# Patient Record
Sex: FEMALE | Race: WHITE | NOT HISPANIC OR LATINO | Employment: OTHER | URBAN - METROPOLITAN AREA
[De-identification: names, ages, dates, MRNs, and addresses within clinical notes are randomized per-mention and may not be internally consistent; named-entity substitution may affect disease eponyms.]

---

## 2017-01-25 ENCOUNTER — GENERIC CONVERSION - ENCOUNTER (OUTPATIENT)
Dept: OTHER | Facility: OTHER | Age: 82
End: 2017-01-25

## 2017-03-22 ENCOUNTER — GENERIC CONVERSION - ENCOUNTER (OUTPATIENT)
Dept: OTHER | Facility: OTHER | Age: 82
End: 2017-03-22

## 2017-04-25 ENCOUNTER — GENERIC CONVERSION - ENCOUNTER (OUTPATIENT)
Dept: OTHER | Facility: OTHER | Age: 82
End: 2017-04-25

## 2017-05-17 ENCOUNTER — APPOINTMENT (OUTPATIENT)
Dept: LAB | Facility: CLINIC | Age: 82
End: 2017-05-17
Payer: MEDICARE

## 2017-05-17 ENCOUNTER — TRANSCRIBE ORDERS (OUTPATIENT)
Dept: LAB | Facility: CLINIC | Age: 82
End: 2017-05-17

## 2017-05-17 DIAGNOSIS — G40.909 EPILEPSY WITHOUT STATUS EPILEPTICUS, NOT INTRACTABLE (HCC): ICD-10-CM

## 2017-05-17 DIAGNOSIS — K94.02: ICD-10-CM

## 2017-05-17 DIAGNOSIS — I10 ESSENTIAL (PRIMARY) HYPERTENSION: ICD-10-CM

## 2017-05-17 LAB
25(OH)D3 SERPL-MCNC: 25.8 NG/ML (ref 30–100)
ALBUMIN SERPL BCP-MCNC: 3.5 G/DL (ref 3.5–5)
ALP SERPL-CCNC: 120 U/L (ref 46–116)
ALT SERPL W P-5'-P-CCNC: 17 U/L (ref 12–78)
ANION GAP SERPL CALCULATED.3IONS-SCNC: 6 MMOL/L (ref 4–13)
AST SERPL W P-5'-P-CCNC: 29 U/L (ref 5–45)
BACTERIA UR QL AUTO: ABNORMAL /HPF
BASOPHILS # BLD AUTO: 0.01 THOUSANDS/ΜL (ref 0–0.1)
BASOPHILS NFR BLD AUTO: 0 % (ref 0–1)
BILIRUB SERPL-MCNC: 0.56 MG/DL (ref 0.2–1)
BILIRUB UR QL STRIP: NEGATIVE
BUN SERPL-MCNC: 13 MG/DL (ref 5–25)
CALCIUM SERPL-MCNC: 9.3 MG/DL (ref 8.3–10.1)
CHLORIDE SERPL-SCNC: 107 MMOL/L (ref 100–108)
CLARITY UR: CLEAR
CO2 SERPL-SCNC: 28 MMOL/L (ref 21–32)
COLOR UR: YELLOW
CREAT SERPL-MCNC: 0.96 MG/DL (ref 0.6–1.3)
EOSINOPHIL # BLD AUTO: 0.22 THOUSAND/ΜL (ref 0–0.61)
EOSINOPHIL NFR BLD AUTO: 3 % (ref 0–6)
ERYTHROCYTE [DISTWIDTH] IN BLOOD BY AUTOMATED COUNT: 14.3 % (ref 11.6–15.1)
GFR SERPL CREATININE-BSD FRML MDRD: 54.5 ML/MIN/1.73SQ M
GGT SERPL-CCNC: 13 U/L (ref 5–85)
GLUCOSE P FAST SERPL-MCNC: 90 MG/DL (ref 65–99)
GLUCOSE UR STRIP-MCNC: NEGATIVE MG/DL
HCT VFR BLD AUTO: 41.8 % (ref 34.8–46.1)
HGB BLD-MCNC: 13.5 G/DL (ref 11.5–15.4)
HGB UR QL STRIP.AUTO: ABNORMAL
HYALINE CASTS #/AREA URNS LPF: ABNORMAL /LPF
KETONES UR STRIP-MCNC: NEGATIVE MG/DL
LEUKOCYTE ESTERASE UR QL STRIP: NEGATIVE
LYMPHOCYTES # BLD AUTO: 1.38 THOUSANDS/ΜL (ref 0.6–4.47)
LYMPHOCYTES NFR BLD AUTO: 18 % (ref 14–44)
MCH RBC QN AUTO: 29.8 PG (ref 26.8–34.3)
MCHC RBC AUTO-ENTMCNC: 32.3 G/DL (ref 31.4–37.4)
MCV RBC AUTO: 92 FL (ref 82–98)
MONOCYTES # BLD AUTO: 0.59 THOUSAND/ΜL (ref 0.17–1.22)
MONOCYTES NFR BLD AUTO: 8 % (ref 4–12)
NEUTROPHILS # BLD AUTO: 5.45 THOUSANDS/ΜL (ref 1.85–7.62)
NEUTS SEG NFR BLD AUTO: 71 % (ref 43–75)
NITRITE UR QL STRIP: NEGATIVE
NON-SQ EPI CELLS URNS QL MICRO: ABNORMAL /HPF
NRBC BLD AUTO-RTO: 0 /100 WBCS
PH UR STRIP.AUTO: 6 [PH] (ref 4.5–8)
PLATELET # BLD AUTO: 217 THOUSANDS/UL (ref 149–390)
PMV BLD AUTO: 9.9 FL (ref 8.9–12.7)
POTASSIUM SERPL-SCNC: 4.4 MMOL/L (ref 3.5–5.3)
PROT SERPL-MCNC: 7.2 G/DL (ref 6.4–8.2)
PROT UR STRIP-MCNC: NEGATIVE MG/DL
RBC # BLD AUTO: 4.53 MILLION/UL (ref 3.81–5.12)
RBC #/AREA URNS AUTO: ABNORMAL /HPF
SODIUM SERPL-SCNC: 141 MMOL/L (ref 136–145)
SP GR UR STRIP.AUTO: 1.01 (ref 1–1.03)
T4 FREE SERPL-MCNC: 0.8 NG/DL (ref 0.76–1.46)
TSH SERPL DL<=0.05 MIU/L-ACNC: 2.31 UIU/ML (ref 0.36–3.74)
UROBILINOGEN UR QL STRIP.AUTO: 0.2 E.U./DL
WBC # BLD AUTO: 7.66 THOUSAND/UL (ref 4.31–10.16)
WBC #/AREA URNS AUTO: ABNORMAL /HPF

## 2017-05-17 PROCEDURE — 82306 VITAMIN D 25 HYDROXY: CPT

## 2017-05-17 PROCEDURE — 36415 COLL VENOUS BLD VENIPUNCTURE: CPT

## 2017-05-17 PROCEDURE — 82977 ASSAY OF GGT: CPT

## 2017-05-17 PROCEDURE — 81001 URINALYSIS AUTO W/SCOPE: CPT

## 2017-05-17 PROCEDURE — 84439 ASSAY OF FREE THYROXINE: CPT

## 2017-05-17 PROCEDURE — 84443 ASSAY THYROID STIM HORMONE: CPT

## 2017-05-17 PROCEDURE — 80053 COMPREHEN METABOLIC PANEL: CPT

## 2017-05-17 PROCEDURE — 85025 COMPLETE CBC W/AUTO DIFF WBC: CPT

## 2017-05-24 ENCOUNTER — ALLSCRIPTS OFFICE VISIT (OUTPATIENT)
Dept: OTHER | Facility: OTHER | Age: 82
End: 2017-05-24

## 2017-05-24 DIAGNOSIS — E78.5 HYPERLIPIDEMIA: ICD-10-CM

## 2017-05-24 DIAGNOSIS — E55.9 VITAMIN D DEFICIENCY: ICD-10-CM

## 2017-05-24 DIAGNOSIS — R91.1 SOLITARY PULMONARY NODULE: ICD-10-CM

## 2017-05-24 DIAGNOSIS — I10 ESSENTIAL (PRIMARY) HYPERTENSION: ICD-10-CM

## 2017-07-13 ENCOUNTER — HOSPITAL ENCOUNTER (EMERGENCY)
Facility: HOSPITAL | Age: 82
Discharge: HOME | End: 2017-07-13
Attending: EMERGENCY MEDICINE | Admitting: EMERGENCY MEDICINE
Payer: MEDICARE

## 2017-07-13 VITALS
OXYGEN SATURATION: 95 % | RESPIRATION RATE: 16 BRPM | DIASTOLIC BLOOD PRESSURE: 64 MMHG | SYSTOLIC BLOOD PRESSURE: 146 MMHG | BODY MASS INDEX: 19.84 KG/M2 | TEMPERATURE: 98.2 F | HEART RATE: 84 BPM | WEIGHT: 105 LBS

## 2017-07-13 DIAGNOSIS — H11.31 SUBCONJUNCTIVAL HEMORRHAGE OF RIGHT EYE: Primary | ICD-10-CM

## 2017-07-13 PROCEDURE — 99283 EMERGENCY DEPT VISIT LOW MDM: CPT

## 2017-07-13 RX ORDER — PROPARACAINE HYDROCHLORIDE 5 MG/ML
1 SOLUTION/ DROPS OPHTHALMIC ONCE
Status: COMPLETED | OUTPATIENT
Start: 2017-07-13 | End: 2017-07-13

## 2017-07-13 RX ADMIN — FLUORESCEIN SODIUM 1 STRIP: 1 STRIP OPHTHALMIC at 21:38

## 2017-07-13 RX ADMIN — PROPARACAINE HYDROCHLORIDE 1 DROP: 5 SOLUTION/ DROPS OPHTHALMIC at 21:38

## 2017-07-18 ENCOUNTER — GENERIC CONVERSION - ENCOUNTER (OUTPATIENT)
Dept: OTHER | Facility: OTHER | Age: 82
End: 2017-07-18

## 2017-09-20 ENCOUNTER — APPOINTMENT (OUTPATIENT)
Dept: LAB | Facility: CLINIC | Age: 82
End: 2017-09-20
Payer: MEDICARE

## 2017-09-20 ENCOUNTER — TRANSCRIBE ORDERS (OUTPATIENT)
Dept: LAB | Facility: CLINIC | Age: 82
End: 2017-09-20

## 2017-09-20 DIAGNOSIS — I10 ESSENTIAL (PRIMARY) HYPERTENSION: ICD-10-CM

## 2017-09-20 DIAGNOSIS — R91.1 SOLITARY PULMONARY NODULE: ICD-10-CM

## 2017-09-20 DIAGNOSIS — E78.5 HYPERLIPIDEMIA: ICD-10-CM

## 2017-09-20 DIAGNOSIS — E55.9 VITAMIN D DEFICIENCY: ICD-10-CM

## 2017-09-20 LAB
25(OH)D3 SERPL-MCNC: 31.4 NG/ML (ref 30–100)
ANION GAP SERPL CALCULATED.3IONS-SCNC: 7 MMOL/L (ref 4–13)
BASOPHILS # BLD AUTO: 0.01 THOUSANDS/ΜL (ref 0–0.1)
BASOPHILS NFR BLD AUTO: 0 % (ref 0–1)
BUN SERPL-MCNC: 13 MG/DL (ref 5–25)
CALCIUM SERPL-MCNC: 9.7 MG/DL (ref 8.3–10.1)
CHLORIDE SERPL-SCNC: 105 MMOL/L (ref 100–108)
CO2 SERPL-SCNC: 28 MMOL/L (ref 21–32)
CREAT SERPL-MCNC: 0.99 MG/DL (ref 0.6–1.3)
EOSINOPHIL # BLD AUTO: 0.17 THOUSAND/ΜL (ref 0–0.61)
EOSINOPHIL NFR BLD AUTO: 2 % (ref 0–6)
ERYTHROCYTE [DISTWIDTH] IN BLOOD BY AUTOMATED COUNT: 14.2 % (ref 11.6–15.1)
GFR SERPL CREATININE-BSD FRML MDRD: 50 ML/MIN/1.73SQ M
GLUCOSE SERPL-MCNC: 99 MG/DL (ref 65–140)
HCT VFR BLD AUTO: 40 % (ref 34.8–46.1)
HGB BLD-MCNC: 13.4 G/DL (ref 11.5–15.4)
LYMPHOCYTES # BLD AUTO: 1.53 THOUSANDS/ΜL (ref 0.6–4.47)
LYMPHOCYTES NFR BLD AUTO: 19 % (ref 14–44)
MAGNESIUM SERPL-MCNC: 2.5 MG/DL (ref 1.6–2.6)
MCH RBC QN AUTO: 29.8 PG (ref 26.8–34.3)
MCHC RBC AUTO-ENTMCNC: 33.5 G/DL (ref 31.4–37.4)
MCV RBC AUTO: 89 FL (ref 82–98)
MONOCYTES # BLD AUTO: 0.62 THOUSAND/ΜL (ref 0.17–1.22)
MONOCYTES NFR BLD AUTO: 8 % (ref 4–12)
NEUTROPHILS # BLD AUTO: 5.64 THOUSANDS/ΜL (ref 1.85–7.62)
NEUTS SEG NFR BLD AUTO: 71 % (ref 43–75)
NRBC BLD AUTO-RTO: 0 /100 WBCS
PLATELET # BLD AUTO: 256 THOUSANDS/UL (ref 149–390)
PMV BLD AUTO: 9.9 FL (ref 8.9–12.7)
POTASSIUM SERPL-SCNC: 3.6 MMOL/L (ref 3.5–5.3)
RBC # BLD AUTO: 4.49 MILLION/UL (ref 3.81–5.12)
SODIUM SERPL-SCNC: 140 MMOL/L (ref 136–145)
TSH SERPL DL<=0.05 MIU/L-ACNC: 2 UIU/ML (ref 0.36–3.74)
WBC # BLD AUTO: 7.99 THOUSAND/UL (ref 4.31–10.16)

## 2017-09-20 PROCEDURE — 84443 ASSAY THYROID STIM HORMONE: CPT

## 2017-09-20 PROCEDURE — 36415 COLL VENOUS BLD VENIPUNCTURE: CPT

## 2017-09-20 PROCEDURE — 80048 BASIC METABOLIC PNL TOTAL CA: CPT

## 2017-09-20 PROCEDURE — 82306 VITAMIN D 25 HYDROXY: CPT

## 2017-09-20 PROCEDURE — 83735 ASSAY OF MAGNESIUM: CPT

## 2017-09-20 PROCEDURE — 85025 COMPLETE CBC W/AUTO DIFF WBC: CPT

## 2017-09-27 ENCOUNTER — ALLSCRIPTS OFFICE VISIT (OUTPATIENT)
Dept: OTHER | Facility: OTHER | Age: 82
End: 2017-09-27

## 2017-09-27 DIAGNOSIS — I10 ESSENTIAL (PRIMARY) HYPERTENSION: ICD-10-CM

## 2017-10-27 NOTE — PROGRESS NOTES
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)   2  HTN (hypertension) (401 9) (I10)   3  Cognitive disorder (294 9) (F09)   4  Crohn's disease of large intestine (555 1) (K50 10)   5  Hyperlipidemia (272 4) (E78 5)   6  Osteoarthritis (715 90) (M19 90)    Plan  HTN (hypertension)    · (1) BASIC METABOLIC PROFILE; Status:Active; Requested GMK:72SJX3632;    · (1) CBC/PLT/DIFF; Status:Active; Requested AZB:73PHM1492;    · (1) T4, FREE; Status:Active; Requested OKJ:86HUK4844;    · (1) TSH; Status:Active; Requested IQQ:24HDF8484;    · Follow-up visit in 6 months Evaluation and Treatment  Follow-up  Status: Hold For - Scheduling   Requested for: 43TLM8122  Need for prophylactic vaccination and inoculation against influenza    · Fluzone High-Dose 0 5 ML Intramuscular Suspension Prefilled Syringe    Discussion/Summary  Discussion Summary: At the present time her blood pressure is controldisease has a colostomydisorders family is supportivescan at the present time both the daughter and myself agreed be 80years old doing well we will avoid the DEXA scan for the time being she is taking vitamin D which prevents falls and fractures  Counseling Documentation With Imm: The patient was counseled regarding diagnostic results,-- instructions for management,-- risk factor reductions,-- prognosis,-- patient and family education,-- impressions,-- risks and benefits of treatment options,-- importance of compliance with treatment  Medication SE Review and Pt Understands Tx: Possible side effects of new medications were reviewed with the patient/guardian today  The treatment plan was reviewed with the patient/guardian   The patient/guardian understands and agrees with the treatment plan      Chief Complaint  Chief Complaint Free Text Note Form: 4 month f/u for osteoarthritisvaccine todayto make an appointment for Dexa Scan      History of Present Illness  HPI: Problem #1 blood pressure well-controlled no chest palpitation shortness of breath she's here with her daughternumber to cause this he has a colostomy no issues  #3 cognitive disorder she is doing well she is walking with a walker falls since the last office visit which is excellent  are stable including renal function and electrolytes and CBC will repeat that when she comes back      Review of Systems  Complete-Female:   Constitutional: No fever, no chills, feels well, no tiredness, no recent weight gain or weight loss  Eyes: No complaints of eye pain, no red eyes, no eyesight problems, no discharge, no dry eyes, no itching of eyes  ENT: no complaints of earache, no loss of hearing, no nose bleeds, no nasal discharge, no sore throat, no hoarseness  Cardiovascular: No complaints of slow heart rate, no fast heart rate, no chest pain, no palpitations, no leg claudication, no lower extremity edema  Respiratory: No complaints of shortness of breath, no wheezing, no cough, no SOB on exertion, no orthopnea, no PND  Gastrointestinal: No complaints of abdominal pain, no constipation, no nausea or vomiting, no diarrhea, no bloody stools  Genitourinary: No complaints of dysuria, no incontinence, no pelvic pain, no dysmenorrhea, no vaginal discharge or bleeding  Musculoskeletal: No complaints of arthralgias, no myalgias, no joint swelling or stiffness, no limb pain or swelling  Integumentary: No complaints of skin rash or lesions, no itching, no skin wounds, no breast pain or lump  Neurological: No complaints of headache, no confusion, no convulsions, no numbness, no dizziness or fainting, no tingling, no limb weakness, no difficulty walking  Psychiatric: Not suicidal, no sleep disturbance, no anxiety or depression, no change in personality, no emotional problems  Endocrine: No complaints of proptosis, no hot flashes, no muscle weakness, no deepening of the voice, no feelings of weakness     Hematologic/Lymphatic: No complaints of swollen glands, no swollen glands in the neck, does not bleed easily, does not bruise easily  Active Problems  1  Abnormal chest xray (793 2) (R93 8)   2  Abrasion of lower extremity, right, initial encounter (916 0) (S80 811A)   3  Cellulitis of extremity (L03 119)   4  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)   5  Cognitive disorder (294 9) (F09)   6  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)   7  Crohn's disease of large intestine (555 1) (K50 10)   8  Cutaneous candidiasis (112 3) (B37 2)   9  Cyst, kidney, 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)   10  Fall, initial encounter (E888 9) (W19 XXXA)   11  Glaucoma screening (V80 1) (Z13 5)   12  History of fall (V15 88) (Z91 81)   13  HTN (hypertension) (401 9) (I10)   14  Hyperlipidemia (272 4) (E78 5)   15  Need for pneumococcal vaccination (V03 82) (Z23)   16  Need for prophylactic vaccination and inoculation against influenza (V04 81) (Z23)   17  Osteoarthritis (715 90) (M19 90)   18  Pulmonary nodule seen on imaging study (793 11) (R91 1)   19  Screening for genitourinary condition (V81 6) (Z13 89)   20  Screening for neurological condition (V80 09) (Z13 89)   21  Screening for osteoporosis (V82 81) (Z13 820)   22  Seizure disorder (345 90) (G40 909)   23  Skin irritation (709 9) (R23 8)   24  Vitamin D deficiency (268 9) (E55 9)    Past Medical History  1  History of Diverticulosis (562 10) (K57 90)   2  History of Osteoporosis (733 00) (M81 0)    Surgical History  1  History of Cataract Surgery   2  History of Ileostomy    Family History  Mother    1  Family history of No history of seizures  Father    2  Family history of No history of seizures    Social History   · Former smoker (W30 77) (P61 128)    Current Meds   1  Calcium 600 MG Oral Tablet; TAKE 1 TABLET DAILY; Therapy: 14CCH4700 to Recorded   2  D 1000 1000 UNIT Oral Tablet; TAKE 1 TABLET DAILY  Requested for: 91VKD4843; Last   Rx:92Fvk0446 Ordered   3   Delzicol 400 MG Oral Capsule Delayed Release; take 1 capsule three times a day;   Therapy: 16Ytw4140 to (Daphne Albright) Recorded   4  Ketoconazole 2 % External Cream; APPLY A THIN LAYER TO AFFECTED AREA(S) TWICE DAILY; Therapy: 11Ruj5358 to (Evaluate:60Jyk8292)  Requested for: 68Neb0513; Last Rx:04Dhx0166   Ordered   5  Multi-Vitamin Oral Tablet; TAKE 1 TABLET DAILY; Therapy: 18CYV7922 to Recorded   6  Tylenol 325 MG Oral Tablet; 2 tablets twice a day for arthritic pain; Therapy: 03KLL2747 to (Evaluate:09Jun2015)  Requested for: 40QWL3733; Last Rx:11Mar2015   Ordered  Medication List Reviewed: The medication list was reviewed and updated today  Allergies  1  Sulfa Drugs   2  Dilaudid TABS    Vitals  Vital Signs    Recorded: 68FTK7421 01:07PM   Temperature 97 2 F   Heart Rate 84   Respiration 16   Systolic 796   Diastolic 60   Height 4 ft 9 in   Weight 106 lb 6 oz   BMI Calculated 23 02   BSA Calculated 1 38     Physical Exam    Constitutional   General appearance: No acute distress, well appearing and well nourished  Eyes   Conjunctiva and lids: No swelling, erythema or discharge  Pupils and irises: Equal, round and reactive to light  Ears, Nose, Mouth, and Throat   External inspection of ears and nose: Normal     Otoscopic examination: Tympanic membranes translucent with normal light reflex  Canals patent without erythema  Nasal mucosa, septum, and turbinates: Normal without edema or erythema  Oropharynx: Normal with no erythema, edema, exudate or lesions  Pulmonary   Respiratory effort: No increased work of breathing or signs of respiratory distress  Auscultation of lungs: Abnormal   Auscultation of the lungs revealed decreased breath sounds diffusely  Cardiovascular   Palpation of heart: Normal PMI, no thrills  Auscultation of heart: Normal rate and rhythm, normal S1 and S2, without murmurs  Examination of extremities for edema and/or varicosities: Normal     Carotid pulses: Normal     Abdomen   Abdomen: Abnormal   The abdomen was flat  Bowel sounds were normal  The abdomen was soft and nontender  The abdomen was not rigid  No rebound tenderness  no masses palpated  The abdomen was normal to percussion  no CVA tenderness   Lymphatic   Palpation of lymph nodes in neck: No lymphadenopathy  Musculoskeletal   Gait and station: Abnormal   Gait evaluation demonstrated an apractic gait  Skin   Skin and subcutaneous tissue: Normal without rashes or lesions      Psychiatric   Orientation to person, place, and time: Normal     Mood and affect: Normal          Signatures   Electronically signed by : CARMITA Estrella ; Sep 27 2017  1:41PM EST                       (Author)

## 2018-01-10 ENCOUNTER — ALLSCRIPTS OFFICE VISIT (OUTPATIENT)
Dept: OTHER | Facility: OTHER | Age: 83
End: 2018-01-10

## 2018-01-11 NOTE — MISCELLANEOUS
Message   Date: 22 Mar 2017 12:55 PM EST, Recorded By: Dexter Hester For: Due[place] Common: Svetlana Gatica, Adult Child   Reason: Medical Complaint   Dory Multani's daughter called to inform you that Bradly Knight fell onto her knees last night when she went to the bathroom  She fell because she didn't have her walker  No other reason  Bradly Knight is ok  She can flex her knees and walk good  No Pain  It's just an FYI call  Active Problems    1  Abnormal chest xray (793 2) (R93 8)   2  Abrasion of lower extremity, right, initial encounter (916 0) (S80 811A)   3  Cellulitis of extremity (L03 119)   4  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)   5  Cognitive disorder (294 9) (F09)   6  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)   7  Crohn's disease of large intestine (555 1) (K50 10)   8  Cutaneous candidiasis (112 3) (B37 2)   9  Cyst, kidney, 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)   10  Glaucoma screening (V80 1) (Z13 5)   11  HTN (hypertension) (401 9) (I10)   12  Hyperlipidemia (272 4) (E78 5)   13  Need for pneumococcal vaccination (V03 82) (Z23)   14  Need for prophylactic vaccination and inoculation against influenza (V04 81) (Z23)   15  Osteoarthritis (715 90) (M19 90)   16  Pulmonary nodule seen on imaging study (793 11) (R91 1)   17  Screening for genitourinary condition (V81 6) (Z13 89)   18  Screening for neurological condition (V80 09) (Z13 89)   19  Seizure disorder (345 90) (G40 909)   20  Skin irritation (709 9) (R23 8)    Current Meds   1  Calcium 600 MG Oral Tablet; TAKE 1 TABLET DAILY; Therapy: 61NZQ3675 to Recorded   2  Delzicol 400 MG Oral Capsule Delayed Release; take 1 capsule three times a day; Therapy: 07Wbu4495 to (Evaluate:05Apr2015) Recorded   3  Ensure Enlive Oral Liquid; USE AS DIRECTED; Therapy: 20Apr2016 to (Last Rx:20Apr2016) Ordered   4  Ketoconazole 2 % External Cream; APPLY A THIN LAYER TO AFFECTED AREA(S)   TWICE DAILY;    Therapy: 65IUW5648 to (Evaluate:72Dch3468)  Requested for: 48Nqw7774; Last   Rx:21Mbr0723 Ordered   5  Multi-Vitamin Oral Tablet; TAKE 1 TABLET DAILY; Therapy: 26PXG6901 to Recorded   6  Nystatin 445811 UNIT/GM External Powder; APPLY 2-3 TIMES DAILY TO AFFECTED   AREA(S); Therapy: 20Apr2016 to (Last Rx:20Apr2016)  Requested for: 20Apr2016 Ordered   7  Tylenol 325 MG Oral Tablet; 2 tablets twice a day for arthritic pain; Therapy: 89PDN2565 to (Evaluate:09Jun2015)  Requested for: 99IQW5725; Last   Rx:11Mar2015 Ordered   8  Vitamin D 400 UNIT TABS; Therapy: (Recorded:20Bvy8224) to Recorded    Allergies    1  Sulfa Drugs   2   Dilaudid TABS    Signatures   Electronically signed by : Peggie Gosselin, M D ; Mar 22 2017  1:21PM EST                       (Author)

## 2018-01-12 NOTE — PROGRESS NOTES
Assessment   1  Herpes zoster without complication (308 2) (K01 2)   2  Cognitive disorder (294 9) (F09)    Plan   Herpes zoster without complication    · ValACYclovir HCl - 1 GM Oral Tablet; One tablet twice a day for 1 week treatment of    has herpes zoster   · (1) CBC/PLT/DIFF; Status:Active; Requested GNU:68ULA3172;    · (1) COMPREHENSIVE METABOLIC PANEL; Status:Active; Requested LAW:51BJX9006;    · (1) GGT; Status:Active; Requested CGS:92YHU0388; Discussion/Summary      Will start her on Valtrex 1 g twice a day adjusted because of her age she is over 80  Even though her renal function is normal  To avoid side effects  The patient was counseled regarding instructions for management,-- risk factor reductions,-- patient and family education,-- impressions,-- importance of compliance with treatment  Possible side effects of new medications were reviewed with the patient/guardian today  The treatment plan was reviewed with the patient/guardian  The patient/guardian understands and agrees with the treatment plan      Chief Complaint   c/o rash on lower back on the left side for 2 days  She has no pain  Her daughter used Ketoconazole Cream on it  History of Present Illness   HPI: Patient came in because was noticed that she had a rash on the left side lower back about the T8 dermatome  With some back discomfort  Came in for further evaluation typical vesicular labs following the dermatosis of herpes zoster  has dementia and the daughter is here with her    ambulates with a [place] falls which is good      Review of Systems        Constitutional: No fever, no chills, feels well, no tiredness, no recent weight gain or loss  ENT: no ear ache, no loss of hearing, no nosebleeds or nasal discharge, no sore throat or hoarseness  Cardiovascular: no complaints of slow or fast heart rate, no chest pain, no palpitations, no leg claudication or lower extremity edema        Respiratory: no complaints of shortness of breath, no wheezing, no dyspnea on exertion, no orthopnea or PND  Breasts: no complaints of breast pain, breast lump or nipple discharge  Gastrointestinal: no complaints of abdominal pain, no constipation, no nausea or diarrhea, no vomiting, no bloody stools  Genitourinary: no complaints of dysuria, no incontinence, no pelvic pain, no dysmenorrhea, no vaginal discharge or abnormal vaginal bleeding  Musculoskeletal: no complaints of arthralgia, no myalgia, no joint swelling or stiffness, no limb pain or swelling  Integumentary: no complaints of skin rash or lesion, no itching or dry skin, no skin wounds  Neurological: no complaints of headache, no confusion, no numbness or tingling, no dizziness or fainting  Active Problems   1  Abnormal chest xray (793 2) (R93 8)   2  Abrasion of lower extremity, right, initial encounter (916 0) (S80 811A)   3  Cellulitis of extremity (L03 119)   4  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)   5  Cognitive disorder (294 9) (F09)   6  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)   7  Crohn's disease of large intestine (555 1) (K50 10)   8  Cutaneous candidiasis (112 3) (B37 2)   9  Cyst, kidney, 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)   10  Fall, initial encounter (E888 9) (W19 XXXA)   11  Glaucoma screening (V80 1) (Z13 5)   12  History of fall (V15 88) (Z91 81)   13  HTN (hypertension) (401 9) (I10)   14  Hyperlipidemia (272 4) (E78 5)   15  Need for pneumococcal vaccination (V03 82) (Z23)   16  Need for prophylactic vaccination and inoculation against influenza (V04 81) (Z23)   17  Osteoarthritis (715 90) (M19 90)   18  Pulmonary nodule seen on imaging study (793 11) (R91 1)   19  Screening for genitourinary condition (V81 6) (Z13 89)   20  Screening for neurological condition (V80 09) (Z13 89)   21  Screening for osteoporosis (V82 81) (Z13 820)   22  Seizure disorder (345 90) (G40 909)   23  Skin irritation (709 9) (R23 8)   24   Vitamin D deficiency (268 9) (E55 9)    Social History    · Former smoker (G62 85) (I87 501)    Current Meds    1  Calcium 600 MG Oral Tablet; TAKE 1 TABLET DAILY; Therapy: 65SEU1288 to Recorded   2  D 1000 1000 UNIT Oral Tablet; TAKE 1 TABLET DAILY  Requested for: 66KPV8680; Last     Rx:44Stx7987 Ordered   3  Delzicol 400 MG Oral Capsule Delayed Release; take 1 capsule three times a day; Therapy: 93Xar7369 to (Evaluate:28Jan2018)  Requested for: 79FOQ8589; Last     Rx:16Czp2309 Ordered   4  Ketoconazole 2 % External Cream; APPLY A THIN LAYER TO AFFECTED AREA(S) TWICE     DAILY; Therapy: 88Kof1406 to (Evaluate:29Lfs4054)  Requested for: 22Ryx6308; Last     Rx:25Ewk9687 Ordered   5  Multi-Vitamin Oral Tablet; TAKE 1 TABLET DAILY; Therapy: 53KVW7435 to Recorded   6  Tylenol 325 MG Oral Tablet; 2 tablets twice a day for arthritic pain; Therapy: 68IEO1816 to (Evaluate:09Jun2015)  Requested for: 83CDR5389; Last     Rx:11Mar2015 Ordered    Allergies   1  Sulfa Drugs   2  Dilaudid TABS    Vitals    Recorded: 87KRQ2556 02:00PM   Temperature 98 5 F   Heart Rate 98   Respiration 15   Systolic 397   Diastolic 75   Height 4 ft 9 in   Weight 110 lb    BMI Calculated 23 8   BSA Calculated 1 39     Physical Exam        Constitutional      General appearance: No acute distress, well appearing and well nourished  Eyes      Conjunctiva and lids: No swelling, erythema or discharge  Pupils and irises: Equal, round and reactive to light  Pulmonary      Respiratory effort: No increased work of breathing or signs of respiratory distress  Auscultation of lungs: Clear to auscultation  Cardiovascular      Palpation of heart: Normal PMI, no thrills  Auscultation of heart: Normal rate and rhythm, normal S1 and S2, without murmurs  Examination of extremities for edema and/or varicosities: Normal        Carotid pulses: Normal        Lymphatic      Palpation of lymph nodes in neck: No lymphadenopathy  Musculoskeletal      Gait and station: Abnormal   Gait evaluation demonstrated ataxia-- and-- Ambulates with a walker  Skin      Examination of the skin for lesions: Abnormal   Vesicle(s)  on the back  Psychiatric      Orientation to person, place, and time: Abnormal   Mental Status: disoriented to person,-- disoriented to place-- and-- disoriented to time  Future Appointments      Date/Time Provider Specialty Site   03/28/2018 01:00 PM CARMITA Villegas   [facility]     Signatures    Electronically signed by : CARMITA Arthur ; Jeffery 10 2018  2:41PM EST                       (Author)

## 2018-01-12 NOTE — MISCELLANEOUS
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)   7  Crohn's disease of large intestine (555 1) (K50 10)   8  Cutaneous candidiasis (112 3) (B37 2)   9  Cyst, kidney, 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)   10  Glaucoma screening (V80 1) (Z13 5)   11  HTN (hypertension) (401 9) (I10)   12  Hyperlipidemia (272 4) (E78 5)   13  Need for pneumococcal vaccination (V03 82) (Z23)   14  Need for prophylactic vaccination and inoculation against influenza (V04 81) (Z23)   15  Osteoarthritis (715 90) (M19 90)   16  Pulmonary nodule seen on imaging study (793 11) (R91 1)   17  Screening for genitourinary condition (V81 6) (Z13 89)   18  Screening for neurological condition (V80 09) (Z13 89)   19  Seizure disorder (345 90) (G40 909)   20  Skin irritation (709 9) (R23 8)    Current Meds   1  Calcium 600 MG Oral Tablet; TAKE 1 TABLET DAILY; Therapy: 75MKY6578 to Recorded   2  Delzicol 400 MG Oral Capsule Delayed Release; take 1 capsule three times a day; Therapy: 75Zfi8015 to (Evaluate:24Vbl3673) Recorded   3  Ensure Enlive Oral Liquid; USE AS DIRECTED; Therapy: 39Ndy7472 to (Last Rx:33Qii2672) Ordered   4  Ketoconazole 2 % External Cream; APPLY A THIN LAYER TO AFFECTED AREA(S)   TWICE DAILY; Therapy: 41Wrb0741 to (Evaluate:67Dhg9091)  Requested for: 06Vuj5513; Last   Rx:90Cvx8701 Ordered   5  Multi-Vitamin Oral Tablet; TAKE 1 TABLET DAILY; Therapy: 25LLV7013 to Recorded   6   Nystatin 763099 UNIT/GM External Powder; APPLY 2-3 TIMES DAILY TO AFFECTED   AREA(S); Therapy: 20Apr2016 to (Last Rx:20Apr2016)  Requested for: 20Apr2016 Ordered   7  Tylenol 325 MG Oral Tablet; 2 tablets twice a day for arthritic pain; Therapy: 78EQL8764 to (Evaluate:09Jun2015)  Requested for: 08HZR4333; Last   Rx:11Mar2015 Ordered   8  Vitamin D 400 UNIT TABS; Therapy: (Recorded:07Aqc6075) to Recorded    Allergies    1  Sulfa Drugs   2   Dilaudid TABS    Signatures   Electronically signed by : CARMITA Fonseca ; Apr 26 2017  7:19AM EST

## 2018-01-12 NOTE — MISCELLANEOUS
Chief Complaint  Chief Complaint Free Text Note Form: D/C [facility] 12 28 16 for multiple fractures of the pelvis  Cha Go (francia) did not wish to make a hospital follow up because Saqib Stover is still in pain and it's hard to move her  Medications were not verified - there was no med list in [facility] at the time this appt was made  Cha Go will call ortho if pain persists  Lidocaine patches were denied at the pharmacy - Medicare does not pay for them  Cha Go will try OTC Aspercreme with 4% Lidocaine or Salon Pas with 4% lidocaine  Cha Go will call with any questions or concerns      History of Present Illness  TCM Communication [facility]: She was hospitalized at [facility] - CLOSED  The date of admission: 12 24 16, date of discharge: 12 28 16  Diagnosis: multiple fractures of pelvis  She was discharged to home  Medications were not reviewed today  She refused a follow up appointment due to lack of mobility  The patient is currently asymptomatic  Communication performed and completed by Sujatha Cook      Active Problems    1  Abnormal chest xray (793 2) (R93 8)   2  Abrasion of lower extremity, right, initial encounter (916 0) (S80 811A)   3  Cellulitis of extremity (L03 119)   4  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)   5  Cognitive disorder (294 9) (F09)   6  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)   7  Crohn's disease of large intestine (555 1) (K50 10)   8  Cutaneous candidiasis (112 3) (B37 2)   9  Cyst, kidney, 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)   10  Glaucoma screening (V80 1) (Z13 5)   11  HTN (hypertension) (401 9) (I10)   12  Hyperlipidemia (272 4) (E78 5)   13  Need for pneumococcal vaccination (V03 82) (Z23)   14  Need for prophylactic vaccination and inoculation against influenza (V04 81) (Z23)   15  Osteoarthritis (715 90) (M19 90)   16  Pulmonary nodule seen on imaging study (793 11) (R91 1)   17  Screening for genitourinary condition (V81 6) (Z13 89)   18  Screening for neurological condition (V80 09) (Z13 89)   19   Seizure disorder (345 90) (G40 909)   20  Skin irritation (709 9) (R23 8)    Past Medical History    1  History of Diverticulosis (562 10) (K57 90)   2  History of Osteoporosis (733 00) (M81 0)    Surgical History    1  History of Cataract Surgery   2  History of Ileostomy    Family History  Mother    1  Family history of No history of seizures  Father    2  Family history of No history of seizures    Social History    · Former smoker (O46 23) (R57 147)    Current Meds   1  Calcium 600 MG Oral Tablet; TAKE 1 TABLET DAILY; Therapy: 18HKC4529 to Recorded   2  Delzicol 400 MG Oral Capsule Delayed Release; take 1 capsule three times a day; Therapy: 12Xzg8157 to (Evaluate:05Apr2015) Recorded   3  Ensure Enlive Oral Liquid; USE AS DIRECTED; Therapy: 40Qfw3516 to (Last Rx:20Apr2016) Ordered   4  Ketoconazole 2 % External Cream; APPLY A THIN LAYER TO AFFECTED AREA(S) TWICE   DAILY; Therapy: 96Rec2074 to (Evaluate:18Whf6853)  Requested for: 78Zda7704; Last   Rx:07Yld0087 Ordered   5  Multi-Vitamin Oral Tablet; TAKE 1 TABLET DAILY; Therapy: 76RJZ0865 to Recorded   6  Nystatin 030271 UNIT/GM External Powder; APPLY 2-3 TIMES DAILY TO AFFECTED   AREA(S); Therapy: 04Qka9541 to (Last Rx:88Chm2147)  Requested for: 74Slf6160 Ordered   7  Tylenol 325 MG Oral Tablet; 2 tablets twice a day for arthritic pain; Therapy: 51EOZ0711 to (Evaluate:09Jun2015)  Requested for: 05KEP8110; Last   Rx:11Mar2015 Ordered   8  Vitamin D 400 UNIT TABS; Therapy: (Recorded:80Rnf5628) to Recorded    Allergies    1  Sulfa Drugs   2  Dilaudid TABS    Future Appointments    Date/Time Provider Specialty Site   05/10/2017 01:15 PM CARMITA Santiago   Internal [facility]     Signatures   Electronically signed by : CARMITA Lantigua ; Dec 29 2016 12:56PM EST                       (Author)

## 2018-01-14 VITALS
SYSTOLIC BLOOD PRESSURE: 131 MMHG | HEART RATE: 82 BPM | WEIGHT: 102 LBS | HEIGHT: 57 IN | TEMPERATURE: 97.5 F | BODY MASS INDEX: 22.01 KG/M2 | RESPIRATION RATE: 15 BRPM | DIASTOLIC BLOOD PRESSURE: 73 MMHG

## 2018-01-15 VITALS
HEIGHT: 57 IN | TEMPERATURE: 97.2 F | SYSTOLIC BLOOD PRESSURE: 122 MMHG | HEART RATE: 84 BPM | DIASTOLIC BLOOD PRESSURE: 60 MMHG | RESPIRATION RATE: 16 BRPM | BODY MASS INDEX: 22.95 KG/M2 | WEIGHT: 106.38 LBS

## 2018-01-15 NOTE — MISCELLANEOUS
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)    Plan  Abnormal chest xray    · * XR CHEST PA & LATERAL; Status:Active; Requested for:10Mar2016;    Perform:[facility]; MSK:16UIB9265;ZNYYIXL; For:Abnormal chest xray; Ordered By:Edgar Brothers;  Chalasia, Hyperlipidemia    · (1) CBC/PLT/DIFF; Status:Active; Requested for:10May2016;    Perform:[facility]; NDL:60FKI4621;PKQDXJH; For:Chalasia, Hyperlipidemia; Ordered By:Edgar Brothers;   · (1) COMPREHENSIVE METABOLIC PANEL; Status:Active; Requested for:10May2016;    Perform:[facility]; LDH:76OZU5403;HRDCCTF; For:Chalasia, Hyperlipidemia; Ordered By:Edgar Brothers;   · (1) GGT; Status:Active; Requested for:10May2016;    Perform:[facility]; ULM:50HWP5287;QJTYOMA; For:Chalasia, Hyperlipidemia; Ordered By:Edgar Brothers;   · (1) MAGNESIUM; Status:Active; Requested for:10May2016;    Perform:[facility]; XAE:50XXE1737;UROGIPI; For:Chalasia, Hyperlipidemia; Ordered By:Edgar Brothers;   · (1) T4, FREE; Status:Active; Requested for:10May2016;    Perform:[facility]; GIQ:70WPF6726;GCOXBQW; For:Chalasia, Hyperlipidemia; Ordered By:Edgar Brothers;   · (1) TSH; Status:Active; Requested for:10May2016;    Perform:[facility]; TQO:41GHM8077;WARIYJQ; For:Chalasia, Hyperlipidemia; Ordered By:Edgar Brothers;   · (1) URIC ACID; Status:Active; Requested for:10May2016;    Perform:[facility]; UCV:81KHE7315;HJKTBTQ; For:Chalasia, Hyperlipidemia; Ordered By:Edgar Brothers;   · (1) VITAMIN D 25-HYDROXY; Status:Active; Requested for:10May2016;    Perform:[facility]; MQI:94LNG0796;EVYUTBM; For:Chalasia, Hyperlipidemia;  Ordered By:Edgar Brothers;   · Follow-up visit in 2 months Evaluation and Treatment  Follow-up  Status: Complete   Done: 10VYB6222   Ordered; For: Chalasia, Hyperlipidemia; Ordered By: Sid Torres Performed:  Due: 15AKX9595; Last Updated By: Ty Hummel; 3/10/2016 3:57:25 PM    Discussion/Summary  Discussion Summary: At the present time no change in medications  We'll check laboratory studies before she comes back with a chest x-ray  The kidney lesion cystic at the present found discussion with the daughter no further workup will be done at this time she is over the age of 80 we can always do an ultrasound the kidney to see if his groin and make that decision when she comes back in 8 weeks  Reviewing the labs her potassium was 3 4 before she left the hospital so I told the daughter to give her high potassium food like our his juice and bananas and so forth  We'll check the potassium and magnesium when she comes back  Chief Complaint  Chief Complaint Free Text Note Form: D/C [facility] 3 3 16 for partial bowel obstruction - resolved  History of Present Illness  [facility]: [facility] records were reviewed  She was hospitalized at [place] [address]  The date of admission: 3 1 16, date of discharge: 3 3  16  Diagnosis: Partial bowel obstruction - resolved  She was discharged to home  Medications were not reviewed today  She scheduled a follow up appointment  The patient is currently asymptomatic  Counseling was provided to the patient and patient's family  Topics counseled included diagnostic results, instructions for management, risk factor reductions and patient and family education     Communication performed and completed by Roisna Garcia   HPI: She is here with her daughter today she is posthospitalization a partial small bowel obstruction  Crohn colitis colectomy and ileostomy  Septated lesion in the right renal cortex and incidental finding  Lower lobe and metoprolol lesions incidental finding  Seizures no recurrences for years she is on Keppra  Urinary incontinence  She was seen by Dr Jonah Belcher a surgery and Dr Mathieu Mack gastroenterologist       Active Problems    1  Cellulitis of extremity (L03 119)   2  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)   3  Cognitive disorder (294 9) (F09)   4  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)   5  Crohn's disease of large intestine (555 1) (K50 10)   6  Glaucoma screening (V80 1) (Z13 5)   7  HTN (hypertension) (401 9) (I10)   8  Hyperlipidemia (272 4) (E78 5)   9  Need for prophylactic vaccination and inoculation against influenza (V04 81) (Z23)   10  Osteoarthritis (715 90) (M19 90)   11  Screening for genitourinary condition (V81 6) (Z13 89)   12  Screening for neurological condition (V80 09) (Z13 89)   13  Seizure disorder (345 90) (G40 909)    Past Medical History    1  History of Diverticulosis (562 10) (K57 90)   2  History of Osteoporosis (733 00) (M81 0)    Surgical History    1  History of Cataract Surgery   2  History of Ileostomy    Family History    1  Family history of No history of seizures    2  Family history of No history of seizures    Social History    · Former smoker (L78 55) (V17 767)    Current Meds   1  Calcium 600 MG Oral Tablet; TAKE 1 TABLET DAILY; Therapy: 63COU4127 to Recorded   2  Delzicol 400 MG Oral Capsule Delayed Release; take 1 capsule three times a day; Therapy: 98Mhp5503 to (Evaluate:05Apr2015) Recorded   3  LevETIRAcetam 250 MG Oral Tablet; TAKE 1 TABLET TWICE DAILYY; Therapy: (Recorded:39Zdv6232) to  Requested for: 71TZP8699 Recorded   4  Multi-Vitamin Oral Tablet; TAKE 1 TABLET DAILY; Therapy: 35NUD9256 to Recorded   5  Tylenol 325 MG Oral Tablet; 2 tablets twice a day for arthritic pain; Therapy: 66LQW4734 to (Evaluate:09Jun2015)  Requested for: 56VOP7774; Last   Rx:11Mar2015 Ordered   6  Vitamin D 400 UNIT Oral Tablet; Therapy: (Recorded:46Nhz3159) to Recorded    Allergies    1  Sulfa Drugs   2   Dilaudid TABS    Vitals  Signs [Data Includes: Current Encounter]   Recorded: 95HHO5945 03: 31PM   Heart Rate: 78  Respiration: 15  Systolic: 494  Diastolic: 60  Height: 4 ft 9 in  Weight: 96 lb 8 oz  BMI Calculated: 20 88  BSA Calculated: 1 32    Physical Exam    Constitutional   General appearance: Abnormal   chronically ill  Eyes   Conjunctiva and lids: No swelling, erythema or discharge  Pupils and irises: Equal, round and reactive to light  Ears, Nose, Mouth, and Throat   Oropharynx: Normal with no erythema, edema, exudate or lesions  Pulmonary   Respiratory effort: No increased work of breathing or signs of respiratory distress  Auscultation of lungs: Clear to auscultation  Cardiovascular   Palpation of heart: Normal PMI, no thrills  Auscultation of heart: Normal rate and rhythm, normal S1 and S2, without murmurs  Examination of extremities for edema and/or varicosities: Normal     Carotid pulses: Normal     Abdomen   Abdomen: Abnormal   The abdomen was scaphoid  Bowel sounds were normal  Skin findings: an ostomy  The abdomen was soft and nontender  no masses palpated  The abdomen was normal to percussion  no CVA tenderness   Liver and spleen: No hepatomegaly or splenomegaly  Lymphatic   Palpation of lymph nodes in neck: No lymphadenopathy  Musculoskeletal   Gait and station: Normal     Digits and nails: Normal without clubbing or cyanosis  Inspection/palpation of joints, bones, and muscles: Normal     Skin   Skin and subcutaneous tissue: Normal without rashes or lesions  Future Appointments    Date/Time Provider Specialty Site   03/23/2016 02:30 PM CARMITA Schmid   Internal Medicine [facility]   03/14/2016 11:45 AM Claude Ray, MD Gastroenterology Adult [address]     Signatures   Electronically signed by : CARMITA Wang ; Mar 10 2016  4:03PM EST                       (Author)

## 2018-01-16 NOTE — MISCELLANEOUS
Message   Recorded as Task   Date: 07/14/2017 12:00 AM, Created By: System   Task Name: Verify External Doc   Assigned To: Paige Noyola   Regarding Patient: Sherley Lopez, Status: Active   Comment:    System - 14 Jul 2017 12:00 AM     To: Paige Noyola    Recipient DirectID: Jose [email or web address]    From:     Sender DirectID: Collin Michel@"[email or web address]  Daniela Aguilarmichaela - 03 Jul 2017 1:41 PM     TASK EDITED  Patient went to ER on 7/14/17 for eye pain  Patient stated has right eye redness around cornea, swelling, sore and no acute change in vision  Patient was treated for subconjunctival hemorrhage of right eye and was given in the ER Proparacaine 0 5% ophthalmic solution 1 drop each eye and fluorescein sodium sterile 1mg ophthalmic strip 1 strip both eyes  I called the patient to follow up on how she is doing and there was no answer and no voicemail for me to leave a message  Active Problems    1  Abnormal chest xray (793 2) (R93 8)   2  Abrasion of lower extremity, right, initial encounter (916 0) (S80 811A)   3  Cellulitis of extremity (L03 119)   4  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)   5  Cognitive disorder (294 9) (F09)   6  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)   7  Crohn's disease of large intestine (555 1) (K50 10)   8  Cutaneous candidiasis (112 3) (B37 2)   9  Cyst, kidney, 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)   10  Fall, initial encounter (E888 9) (W19 XXXA)   11  Glaucoma screening (V80 1) (Z13 5)   12  History of fall (V15 88) (Z91 81)   13  HTN (hypertension) (401 9) (I10)   14  Hyperlipidemia (272 4) (E78 5)   15  Need for pneumococcal vaccination (V03 82) (Z23)   16  Need for prophylactic vaccination and inoculation against influenza (V04 81) (Z23)   17  Osteoarthritis (715 90) (M19 90)   18  Pulmonary nodule seen on imaging study (793 11) (R91 1)   19  Screening for genitourinary condition (V81 6) (Z13 89)   20  Screening for neurological condition (V80 09) (Z13 89)   21  Screening for osteoporosis (V82 81) (Z13 820)   22  Seizure disorder (345 90) (G40 909)   23  Skin irritation (709 9) (R23 8)   24  Vitamin D deficiency (268 9) (E55 9)    Current Meds   1  Calcium 600 MG Oral Tablet; TAKE 1 TABLET DAILY; Therapy: 04GUZ2556 to Recorded   2  D 1000 1000 UNIT Oral Tablet; TAKE 1 TABLET DAILY  Requested for: 56NKW8246; Last   Rx:47Egl9042 Ordered   3  Delzicol 400 MG Oral Capsule Delayed Release; take 1 capsule three times a day; Therapy: 12Ewu3655 to (Evaluate:05Apr2015) Recorded   4  Ensure Enlive Oral Liquid; USE AS DIRECTED; Therapy: 02Vhr2392 to (Last Rx:20Apr2016) Ordered   5  Ketoconazole 2 % External Cream; APPLY A THIN LAYER TO AFFECTED AREA(S)   TWICE DAILY; Therapy: 40Omq8392 to (Evaluate:28Fcu0108)  Requested for: 25Edz3494; Last   Rx:65Jvg9865 Ordered   6  Multi-Vitamin Oral Tablet; TAKE 1 TABLET DAILY; Therapy: 00JCG5428 to Recorded   7  Nystatin 560923 UNIT/GM External Powder; APPLY 2-3 TIMES DAILY TO AFFECTED   AREA(S); Therapy: 54Khc3951 to (Last Rx:27Vuv0368)  Requested for: 50Lcl6329 Ordered   8  Tylenol 325 MG Oral Tablet; 2 tablets twice a day for arthritic pain; Therapy: 82PXA6328 to (Evaluate:09Jun2015)  Requested for: 78YXS1830; Last   Rx:11Mar2015 Ordered    Allergies    1  Sulfa Drugs   2   Dilaudid TABS    Signatures   Electronically signed by : CARMITA Enriquez ; Jul 18 2017  3:21PM EST

## 2018-01-16 NOTE — MISCELLANEOUS
Message  Patient's daughter called the office c/o that the patient had redness around her ostomy site which she notice today  Patient's daughter stated that there is no swelling, redness, itchiness, or drainage around the site  After speaking to the provider , I called the patient's daughter and told her that the provider prescribed her Nizoral cream to use twice daily on effected area  Prescription was sent over to the patient's pharmacy  Active Problems    1  Abnormal chest xray (793 2) (R93 8)   2  Abrasion of lower extremity, right, initial encounter (916 0) (S80 811A)   3  Cellulitis of extremity (L03 119)   4  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)   5  Cognitive disorder (294 9) (F09)   6  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)   7  Crohn's disease of large intestine (555 1) (K50 10)   8  Cutaneous candidiasis (112 3) (B37 2)   9  Cyst, kidney, 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)   10  Glaucoma screening (V80 1) (Z13 5)   11  HTN (hypertension) (401 9) (I10)   12  Hyperlipidemia (272 4) (E78 5)   13  Need for pneumococcal vaccination (V03 82) (Z23)   14  Need for prophylactic vaccination and inoculation against influenza (V04 81) (Z23)   15  Osteoarthritis (715 90) (M19 90)   16  Pulmonary nodule seen on imaging study (793 11) (R91 1)   17  Screening for genitourinary condition (V81 6) (Z13 89)   18  Screening for neurological condition (V80 09) (Z13 89)   19  Seizure disorder (345 90) (G40 909)   20  Skin irritation (709 9) (R23 8)    Current Meds   1  Calcium 600 MG Oral Tablet; TAKE 1 TABLET DAILY; Therapy: 41BCI0500 to Recorded   2  Delzicol 400 MG Oral Capsule Delayed Release; take 1 capsule three times a day; Therapy: 57Cpg5791 to (Evaluate:30Jny2966) Recorded   3  Ensure Enlive Oral Liquid; USE AS DIRECTED; Therapy: 63Vyp5202 to (Last Rx:20Apr2016) Ordered   4  Ketoconazole 2 % External Cream; APPLY A THIN LAYER TO AFFECTED AREA(S)   TWICE DAILY;    Therapy: 93XIR5137 to (Evaluate:69Qcl9302) Requested for: 17Bdg2545; Last   Rx:90Ecy4831; Status: ACTIVE - Retrospective Authorization Ordered   5  Multi-Vitamin Oral Tablet; TAKE 1 TABLET DAILY; Therapy: 97HCW4898 to Recorded   6  Nystatin 497867 UNIT/GM External Powder; APPLY 2-3 TIMES DAILY TO AFFECTED   AREA(S); Therapy: 20Apr2016 to (Last Rx:20Apr2016)  Requested for: 20Apr2016 Ordered   7  Tylenol 325 MG Oral Tablet; 2 tablets twice a day for arthritic pain; Therapy: 35FEL6277 to (Evaluate:09Jun2015)  Requested for: 67OHG6754; Last   Rx:11Mar2015 Ordered   8  Vitamin D 400 UNIT TABS; Therapy: (Recorded:43Skd6531) to Recorded    Allergies    1  Sulfa Drugs   2   Dilaudid TABS    Signatures   Electronically signed by : CARMITA Godwin ; Dec 12 2016  6:44PM EST

## 2018-01-17 DIAGNOSIS — B02.9 ZOSTER WITHOUT COMPLICATIONS: ICD-10-CM

## 2018-01-17 NOTE — MISCELLANEOUS
Message   Date: 01 Mar 2016 3:17 PM EST, Recorded By: Laura Beaulieu For: Rhoda Horne: Mathieu Palafox, Adult Child   Reason: Medical Complaint   Patients daughter Mathieu Palafox called stated patient for the past hour has sharp cramping abdominal pain and its not coming from her Colostomy bag  She stated her pain level is at 7 from scale from 1-10  I spoke with Dr Enedelia Caba and he stated since her pain level is at a 7 she should take her to the ER at this time to be evaluated  I spoke with the daughter and gave her Dr Rhoades Console orders and she understood  Active Problems    1  Cellulitis of extremity (L03 119)   2  Cognitive disorder (294 9) (F09)   3  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)   4  Crohn's disease of large intestine (555 1) (K50 10)   5  Gastroesophageal reflux (530 81) (K21 9)   6  Glaucoma screening (V80 1) (Z13 5)   7  HTN (hypertension) (401 9) (I10)   8  Hyperlipidemia (272 4) (E78 5)   9  Need for prophylactic vaccination and inoculation against influenza (V04 81) (Z23)   10  Osteoarthritis (715 90) (M19 90)   11  Screening for genitourinary condition (V81 6) (Z13 89)   12  Screening for neurological condition (V80 09) (Z13 89)   13  Seizure disorder (345 90) (G40 909)    Current Meds   1  Calcium 600 MG Oral Tablet; TAKE 1 TABLET DAILY; Therapy: 66RQS4030 to Recorded   2  Delzicol 400 MG Oral Capsule Delayed Release; take 1 capsule three times a day; Therapy: 53Tch0889 to (Evaluate:05Apr2015) Recorded   3  LevETIRAcetam 250 MG Oral Tablet (Keppra); TAKE 1 TABLET TWICE DAILYY; Therapy: (Recorded:56Qzp7843) to  Requested for: 58DAP9734 Recorded   4  Multi-Vitamin Oral Tablet; TAKE 1 TABLET DAILY; Therapy: 70OAM7245 to Recorded   5  Tylenol 325 MG Oral Tablet; 2 tablets twice a day for arthritic pain; Therapy: 42IVA9387 to (Evaluate:09Jun2015)  Requested for: 75YCI8207; Last   Rx:11Mar2015 Ordered   6  Vitamin D 400 UNIT Oral Tablet;    Therapy: (Recorded:32Okd9639) to Recorded    Allergies    1  Sulfa Drugs   2   Dilaudid TABS    Signatures   Electronically signed by : CARMITA George ; Mar  1 2016  6:10PM EST

## 2018-01-23 ENCOUNTER — GENERIC CONVERSION - ENCOUNTER (OUTPATIENT)
Dept: OTHER | Facility: OTHER | Age: 83
End: 2018-01-23

## 2018-01-23 VITALS
RESPIRATION RATE: 15 BRPM | SYSTOLIC BLOOD PRESSURE: 147 MMHG | TEMPERATURE: 98.5 F | HEIGHT: 57 IN | BODY MASS INDEX: 23.73 KG/M2 | HEART RATE: 98 BPM | DIASTOLIC BLOOD PRESSURE: 75 MMHG | WEIGHT: 110 LBS

## 2018-01-24 NOTE — MISCELLANEOUS
Message     Date: 23 Jan 2018 4:42 PM EST, Recorded By: David Garcia For: Nhung Beckman   Caller: Flakita Shelton, Adult Child   Reason: Medical Complaint   PC from [facility]  re: PT with a rash under her R breast  She recently had & was treated for Shingles on the L side of her back 2 wks ago  She has not had any fever, pain, itching, redness with this rash  Dght explained she had no symptoms with the Shingles the first time either  They have used Ketokonizole cream on  this new rash  I spoke to DR Greg Garcia & he would like them to use the above cream on the rash & if no improvement he would like to see the Pt  Dght notified of the above & understands  She will call on Thurs  to schedule OV if no improvement  Active Problems    1  Abnormal chest xray (793 2) (R93 8)   2  Abrasion of lower extremity, right, initial encounter (916 0) (S80 811A)   3  Cellulitis of extremity (L03 119)   4  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)   5  Cognitive disorder (294 9) (F09)   6  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)   7  Crohn's disease of large intestine (555 1) (K50 10)   8  Cutaneous candidiasis (112 3) (B37 2)   9  Cyst, kidney, 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)   10  Fall, initial encounter (E888 9) (W19 XXXA)   11  Glaucoma screening (V80 1) (Z13 5)   12  Herpes zoster without complication (754 6) (B84 0)   13  History of fall (V15 88) (Z91 81)   14  HTN (hypertension) (401 9) (I10)   15  Hyperlipidemia (272 4) (E78 5)   16  Need for pneumococcal vaccination (V03 82) (Z23)   17  Need for prophylactic vaccination and inoculation against influenza (V04 81) (Z23)   18  Osteoarthritis (715 90) (M19 90)   19  Pulmonary nodule seen on imaging study (793 11) (R91 1)   20  Screening for genitourinary condition (V81 6) (Z13 89)   21  Screening for neurological condition (V80 09) (Z13 89)   22  Screening for osteoporosis (V82 81) (Z13 820)   23  Seizure disorder (345 90) (G40 909)   24  Skin irritation (933 9) (R23 8)   25  Vitamin D deficiency (268 9) (E55 9)    Current Meds   1  Calcium 600 MG Oral Tablet; TAKE 1 TABLET DAILY; Therapy: 39UQG1259 to Recorded   2  D 1000 1000 UNIT Oral Tablet; TAKE 1 TABLET DAILY  Requested for: 01JAO5041; Last   Rx:59Obq6438 Ordered   3  Delzicol 400 MG Oral Capsule Delayed Release; take 1 capsule three times a day; Therapy: 99Udu4915 to (Evaluate:28Jan2018)  Requested for: 42KXF4608; Last   Rx:30Oct2017 Ordered   4  Ketoconazole 2 % External Cream; APPLY A THIN LAYER TO AFFECTED AREA(S)   TWICE DAILY; Therapy: 12Ulc5216 to (Evaluate:48Mus4694)  Requested for: 34Hty5973; Last   Rx:72Cpg6183 Ordered   5  Multi-Vitamin Oral Tablet; TAKE 1 TABLET DAILY; Therapy: 99HNO7722 to Recorded   6  Tylenol 325 MG Oral Tablet; 2 tablets twice a day for arthritic pain; Therapy: 47XYP1648 to (Evaluate:09Jun2015)  Requested for: 92ARD8902; Last   Rx:11Mar2015 Ordered   7  ValACYclovir HCl - 1 GM Oral Tablet; One tablet twice a day for 1 week treatment of has   herpes zoster; Therapy: 22ADB9505 to (Last Rx:10Jan2018)  Requested for: 08TNH2270 Ordered    Allergies    1  Sulfa Drugs   2   Dilaudid TABS    Signatures   Electronically signed by : CARMITA Corbett ; Jan 23 2018  5:05PM EST

## 2018-02-03 ENCOUNTER — APPOINTMENT (EMERGENCY)
Dept: RADIOLOGY | Facility: HOSPITAL | Age: 83
End: 2018-02-03
Payer: MEDICARE

## 2018-02-03 ENCOUNTER — HOSPITAL ENCOUNTER (EMERGENCY)
Facility: HOSPITAL | Age: 83
Discharge: HOME | End: 2018-02-03
Attending: EMERGENCY MEDICINE | Admitting: EMERGENCY MEDICINE
Payer: MEDICARE

## 2018-02-03 ENCOUNTER — APPOINTMENT (EMERGENCY)
Dept: CT IMAGING | Facility: HOSPITAL | Age: 83
End: 2018-02-03
Payer: MEDICARE

## 2018-02-03 VITALS
RESPIRATION RATE: 18 BRPM | OXYGEN SATURATION: 98 % | DIASTOLIC BLOOD PRESSURE: 73 MMHG | WEIGHT: 112.66 LBS | BODY MASS INDEX: 24.38 KG/M2 | TEMPERATURE: 97.3 F | SYSTOLIC BLOOD PRESSURE: 136 MMHG | HEART RATE: 72 BPM
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
## 2018-02-03 DIAGNOSIS — S70.01XA CONTUSION OF RIGHT HIP: ICD-10-CM

## 2018-02-03 LAB
ANION GAP SERPL CALCULATED.3IONS-SCNC: 7 MMOL/L (ref 4–13)
APTT PPP: 26 SECONDS (ref 23–35)
BASOPHILS # BLD AUTO: 0.03 THOUSANDS/ΜL (ref 0–0.1)
BASOPHILS NFR BLD AUTO: 0 % (ref 0–1)
BUN SERPL-MCNC: 20 MG/DL (ref 5–25)
CALCIUM SERPL-MCNC: 9 MG/DL (ref 8.3–10.1)
CHLORIDE SERPL-SCNC: 106 MMOL/L (ref 100–108)
CK SERPL-CCNC: 73 U/L (ref 26–192)
CO2 SERPL-SCNC: 30 MMOL/L (ref 21–32)
CREAT SERPL-MCNC: 1.07 MG/DL (ref 0.6–1.3)
EOSINOPHIL # BLD AUTO: 0.21 THOUSAND/ΜL (ref 0–0.61)
EOSINOPHIL NFR BLD AUTO: 2 % (ref 0–6)
ERYTHROCYTE [DISTWIDTH] IN BLOOD BY AUTOMATED COUNT: 14.9 % (ref 11.6–15.1)
GFR SERPL CREATININE-BSD FRML MDRD: 45 ML/MIN/1.73SQ M
GLUCOSE SERPL-MCNC: 108 MG/DL (ref 65–140)
HCT VFR BLD AUTO: 40.2 % (ref 34.8–46.1)
HGB BLD-MCNC: 13.2 G/DL (ref 11.5–15.4)
INR PPP: 0.96 (ref 0.86–1.16)
LYMPHOCYTES # BLD AUTO: 1 THOUSANDS/ΜL (ref 0.6–4.47)
LYMPHOCYTES NFR BLD AUTO: 8 % (ref 14–44)
MCH RBC QN AUTO: 30.4 PG (ref 26.8–34.3)
MCHC RBC AUTO-ENTMCNC: 32.8 G/DL (ref 31.4–37.4)
MCV RBC AUTO: 93 FL (ref 82–98)
MONOCYTES # BLD AUTO: 0.95 THOUSAND/ΜL (ref 0.17–1.22)
MONOCYTES NFR BLD AUTO: 8 % (ref 4–12)
NEUTROPHILS # BLD AUTO: 9.69 THOUSANDS/ΜL (ref 1.85–7.62)
NEUTS SEG NFR BLD AUTO: 82 % (ref 43–75)
NRBC BLD AUTO-RTO: 0 /100 WBCS
PLATELET # BLD AUTO: 217 THOUSANDS/UL (ref 149–390)
PMV BLD AUTO: 10.1 FL (ref 8.9–12.7)
POTASSIUM SERPL-SCNC: 3.9 MMOL/L (ref 3.5–5.3)
PROTHROMBIN TIME: 12.8 SECONDS (ref 12.1–14.4)
RBC # BLD AUTO: 4.34 MILLION/UL (ref 3.81–5.12)
SODIUM SERPL-SCNC: 143 MMOL/L (ref 136–145)
WBC # BLD AUTO: 11.88 THOUSAND/UL (ref 4.31–10.16)

## 2018-02-03 PROCEDURE — 70450 CT HEAD/BRAIN W/O DYE: CPT

## 2018-02-03 PROCEDURE — 85025 COMPLETE CBC W/AUTO DIFF WBC: CPT | Performed by: EMERGENCY MEDICINE

## 2018-02-03 PROCEDURE — 82550 ASSAY OF CK (CPK): CPT | Performed by: EMERGENCY MEDICINE

## 2018-02-03 PROCEDURE — 72125 CT NECK SPINE W/O DYE: CPT

## 2018-02-03 PROCEDURE — 73502 X-RAY EXAM HIP UNI 2-3 VIEWS: CPT

## 2018-02-03 PROCEDURE — 85730 THROMBOPLASTIN TIME PARTIAL: CPT | Performed by: EMERGENCY MEDICINE

## 2018-02-03 PROCEDURE — 73700 CT LOWER EXTREMITY W/O DYE: CPT

## 2018-02-03 PROCEDURE — 80048 BASIC METABOLIC PNL TOTAL CA: CPT | Performed by: EMERGENCY MEDICINE

## 2018-02-03 PROCEDURE — 85610 PROTHROMBIN TIME: CPT | Performed by: EMERGENCY MEDICINE

## 2018-02-03 PROCEDURE — 99284 EMERGENCY DEPT VISIT MOD MDM: CPT

## 2018-02-03 PROCEDURE — 36415 COLL VENOUS BLD VENIPUNCTURE: CPT | Performed by: EMERGENCY MEDICINE

## 2018-02-03 RX ORDER — KETOCONAZOLE 20 MG/G
1 CREAM TOPICAL 2 TIMES DAILY
COMMUNITY
End: 2018-05-21 | Stop reason: SDUPTHER

## 2018-02-03 NOTE — DISCHARGE INSTRUCTIONS

## 2018-02-05 NOTE — ED PROVIDER NOTES
History  Chief Complaint   Patient presents with    Fall     pt fell around 430am today  noticed motion sensor at home went off  pt got up, slip and fell  uses walker, c/o left sided groin pain  pt has home health aide who assisted her back in bed  no deformity to left leg noted  pulses palpable no thinners, did not hit head  History provided by:  Patient and relative   used: No    Fall   Mechanism of injury: fall    Injury location:  Leg  Leg injury location:  L hip (left groin)  Incident location:  Home  Time since incident: happened at 0430 hrs  Fall:     Fall occurred: ground level at home  Impact surface:  Hard floor    Point of impact: left side  Entrapped after fall: no    Suspicion of alcohol use: no    Suspicion of drug use: no    Associated symptoms: no abdominal pain, no back pain, no chest pain, no difficulty breathing, no headaches, no loss of consciousness, no nausea, no neck pain and no vomiting    Risk factors: no anticoagulation therapy    Risk factors comment:  Age, uses a walker  No head injury  No HA, n/v, no cp or abd pain  No other extremity pain  H/O prior pelvic fracture  Prior to Admission Medications   Prescriptions Last Dose Informant Patient Reported? Taking? Vitamin D, Cholecalciferol, 400 UNITS CAPS   Yes Yes   Sig: Take 1,000 Units by mouth daily     acetaminophen (TYLENOL) 325 mg tablet   Yes Yes   Sig: Take 650 mg by mouth every 6 (six) hours as needed for mild pain  calcium carbonate (OS-KYLEE) 600 MG tablet   Yes Yes   Sig: Take 600 mg by mouth daily  ketoconazole (NIZORAL) 2 % cream   Yes Yes   Sig: Apply 1 application topically 2 (two) times a day   mesalamine (DELZICOL) 400 mg   Yes Yes   Sig: Take 400 mg by mouth 3 (three) times a day  multivitamin (THERAGRAN) TABS   Yes Yes   Sig: Take 1 tablet by mouth daily        Facility-Administered Medications: None       Past Medical History:   Diagnosis Date    [facility] colitis ([facility] [address] )     OA (osteoarthritis)     Seizures (HCC)     only 1 episode 2011    Urinary incontinence        Past Surgical History:   Procedure Laterality Date    ABDOMINAL SURGERY      CATARACT EXTRACTION      ILEOSTOMY         Family History   Problem Relation Age of Onset    Family history unknown: Yes     I have reviewed and agree with the history as documented  Social History   Substance Use Topics    Smoking status: Former Smoker    Smokeless tobacco: Never Used    Alcohol use No        Review of Systems   Constitutional: Negative for chills and fever  HENT: Negative for congestion and sore throat  Respiratory: Negative for cough, chest tightness and shortness of breath  Cardiovascular: Negative for chest pain and leg swelling  Gastrointestinal: Negative for abdominal pain, diarrhea, nausea and vomiting  Genitourinary: Negative for dysuria and flank pain  Musculoskeletal: Positive for gait problem  Negative for arthralgias, back pain, joint swelling and neck pain  Skin: Negative for wound  Neurological: Negative for loss of consciousness, weakness, numbness and headaches  All other systems reviewed and are negative  Physical Exam  ED Triage Vitals [02/03/18 1030]   Temperature Pulse Respirations Blood Pressure SpO2   (!) 97 3 °F (36 3 °C) 81 20 156/61 97 %      Temp Source Heart Rate Source Patient Position - Orthostatic VS BP Location FiO2 (%)   Oral Monitor Lying Right arm --      Pain Score       5           Orthostatic Vital Signs  Vitals:    02/03/18 1030 02/03/18 1200 02/03/18 1520   BP: 156/61 147/67 136/73   Pulse: 81 68 72   Patient Position - Orthostatic VS: Lying Lying Lying       Physical Exam   Constitutional: She appears well-developed and well-nourished  She is cooperative  Non-toxic appearance  She does not have a sickly appearance  She does not appear ill  No distress  HENT:   Head: Normocephalic and atraumatic  Right Ear: Hearing normal  No drainage or swelling     Left Ear: Hearing normal  No drainage or swelling  Mouth/Throat: Mucous membranes are normal    Eyes: Conjunctivae and lids are normal  Right eye exhibits no discharge  Left eye exhibits no discharge  Neck: Trachea normal and normal range of motion  No JVD present  Cardiovascular: Normal rate, regular rhythm, normal heart sounds, intact distal pulses and normal pulses  Exam reveals no gallop and no friction rub  No murmur heard  Pulmonary/Chest: Effort normal and breath sounds normal  No stridor  No respiratory distress  She has no wheezes  She has no rales  Abdominal: Soft  Normal appearance  She exhibits no distension, no ascites and no mass  There is no hepatosplenomegaly  There is no tenderness  There is no rebound, no guarding and no CVA tenderness  Musculoskeletal: Normal range of motion  She exhibits no edema or deformity  Right hip: Normal         Left hip: She exhibits tenderness and bony tenderness  She exhibits normal range of motion, normal strength, no swelling, no crepitus and no deformity  Left knee: Normal         Cervical back: Normal         Thoracic back: Normal         Lumbar back: Normal         Left upper leg: Normal    Lymphadenopathy:        Right: No inguinal adenopathy present  Left: No inguinal adenopathy present  Neurological: She is alert  She has normal strength  No cranial nerve deficit or sensory deficit  She exhibits normal muscle tone  Coordination and gait normal  GCS eye subscore is 4  GCS verbal subscore is 5  GCS motor subscore is 6  Skin: Skin is warm, dry and intact  No rash noted  She is not diaphoretic  No pallor  Psychiatric: She has a normal mood and affect  Her speech is normal  Cognition and memory are normal    Nursing note and vitals reviewed        ED Medications  Medications - No data to display    Diagnostic Studies  Results Reviewed     Procedure Component Value Units Date/Time    Basic metabolic panel [[id number]] Collected: 02/03/18 1051    Lab Status:  Final result Specimen:  Blood from Arm, Left Updated:  02/03/18 1114     Sodium 143 mmol/L      Potassium 3 9 mmol/L      Chloride 106 mmol/L      CO2 30 mmol/L      Anion Gap 7 mmol/L      BUN 20 mg/dL      Creatinine 1 07 mg/dL      Glucose 108 mg/dL      Calcium 9 0 mg/dL      eGFR 45 ml/min/1 73sq m     Narrative:         National Kidney Disease Education Program recommendations are as follows:  GFR calculation is accurate only with a steady state creatinine  Chronic Kidney disease less than 60 ml/min/1 73 sq  meters  Kidney failure less than 15 ml/min/1 73 sq  meters  CK Total with Reflex CKMB [[id number]]  (Normal) Collected:  02/03/18 1051    Lab Status:  Final result Specimen:  Blood from Arm, Left Updated:  02/03/18 1114     Total CK 73 U/L     Protime-INR [[id number]]  (Normal) Collected:  02/03/18 1051    Lab Status:  Final result Specimen:  Blood from Arm, Left Updated:  02/03/18 1109     Protime 12 8 seconds      INR 0 96    APTT [[id number]]  (Normal) Collected:  02/03/18 1051    Lab Status:  Final result Specimen:  Blood from Arm, Left Updated:  02/03/18 1109     PTT 26 seconds     Narrative:          Therapeutic Heparin Range = 60-90 seconds    CBC and differential [[id number]]  (Abnormal) Collected:  02/03/18 1051    Lab Status:  Final result Specimen:  Blood from Arm, Left Updated:  02/03/18 1059     WBC 11 88 (H) Thousand/uL      RBC 4 34 Million/uL      Hemoglobin 13 2 g/dL      Hematocrit 40 2 %      MCV 93 fL      MCH 30 4 pg      MCHC 32 8 g/dL      RDW 14 9 %      MPV 10 1 fL      Platelets 919 Thousands/uL      nRBC 0 /100 WBCs      Neutrophils Relative 82 (H) %      Lymphocytes Relative 8 (L) %      Monocytes Relative 8 %      Eosinophils Relative 2 %      Basophils Relative 0 %      Neutrophils Absolute 9 69 (H) Thousands/µL      Lymphocytes Absolute 1 00 Thousands/µL      Monocytes Absolute 0 95 Thousand/µL      Eosinophils Absolute 0 21 Thousand/µL      Basophils Absolute 0 03 Thousands/µL                  XR hip/pelv 2-3 vws left   ED Interpretation by Candace Plaza MD (02/03 1511)   I have personally reviewed the x-ray and my findings are: old fractures seen, no acute         Final Result by Nikunj Chen MD (02/03 1205)      No acute osseous abnormality  Workstation performed: [id number]         CT hip left without contrast   Final Result by Irma Rios MD (02/03 1254)      No acute left hip fracture         Workstation performed: [id number]         CT cervical spine without contrast   Final Result by Suraj Davis MD (02/03 1156)      No cervical spine fracture or traumatic malalignment  Multilevel degenerative disc disease and facet arthropathy  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  6 mm left lower pole thyroid nodule  Incidental discovery of one or more thyroid nodule(s) measuring less than 1 5 cm and without suspicious features is noted in this patient who is above 28years old; according to guidelines published in the February 2015 white paper on incidental thyroid nodules in the Journal of the [employer] of Radiology [facility]), no further evaluation is recommended  Workstation performed: [id number]         CT head without contrast   Final Result by Irma Rios MD (02/03 1126)      No acute intracranial abnormality  Workstation performed: [id number]                    Procedures  Procedures       Phone Contacts  ED Phone Contact    ED Course  ED Course                                MDM  Number of Diagnoses or Management Options  Contusion of right hip:   Fall:   Diagnosis management comments: Nop fracture bruise or wound noted  Able to ambulate with a walker          Amount and/or Complexity of Data Reviewed  Clinical lab tests: reviewed and ordered  Tests in the radiology section of CPT®: ordered and reviewed  Independent visualization of images, tracings, or specimens: yes    Patient Progress  Patient progress: stable    CritCare Time    Disposition  Final diagnoses:   Fall   Contusion of right hip     Time reflects when diagnosis was documented in both MDM as applicable and the Disposition within this note     Time User Action Codes Description Comment    2/3/2018  3:10 PM Poppy Gayle Add [M60  XXXA] Fall     2/3/2018  3:10 PM Poppy Gayle Add [S70 [id number]] Contusion of right hip       ED Disposition     ED Disposition Condition Comment    Discharge  Stacy Nelson 587 discharge to home/self care  Condition at discharge: Good        Follow-up Information     Follow up With Specialties Details Why Jeremias Llamas MD Internal Medicine Schedule an appointment as soon as possible for a visit in 2 days If symptoms worsen [address] [facility]  [address] [facility] [address] [facility]          Discharge Medication List as of 2/3/2018  3:11 PM      CONTINUE these medications which have NOT CHANGED    Details   acetaminophen (TYLENOL) 325 mg tablet Take 650 mg by mouth every 6 (six) hours as needed for mild pain , Historical Med      calcium carbonate (OS-KYLEE) 600 MG tablet Take 600 mg by mouth daily  , Until Discontinued, Historical Med      ketoconazole (NIZORAL) 2 % cream Apply 1 application topically 2 (two) times a day, Historical Med      mesalamine (DELZICOL) 400 mg Take 400 mg by mouth 3 (three) times a day , Until Discontinued, Historical Med      multivitamin (THERAGRAN) TABS Take 1 tablet by mouth daily  , Until Discontinued, Historical Med      Vitamin D, Cholecalciferol, 400 UNITS CAPS Take 1,000 Units by mouth daily  , Historical Med           No discharge procedures on file      ED Provider  Electronically Signed by           Eugenio Watkins MD  02/04/18 7797

## 2018-02-06 ENCOUNTER — OFFICE VISIT (OUTPATIENT)
Dept: INTERNAL MEDICINE CLINIC | Facility: CLINIC | Age: 83
End: 2018-02-06
Payer: MEDICARE

## 2018-02-06 VITALS
WEIGHT: 108.8 LBS | HEART RATE: 81 BPM | RESPIRATION RATE: 15 BRPM | SYSTOLIC BLOOD PRESSURE: 113 MMHG | BODY MASS INDEX: 23.54 KG/M2 | TEMPERATURE: 98.3 F | DIASTOLIC BLOOD PRESSURE: 70 MMHG

## 2018-02-06 DIAGNOSIS — C44.629: Primary | ICD-10-CM

## 2018-02-06 DIAGNOSIS — C44.629 SQUAMOUS CELL CARCINOMA OF SKIN OF LEFT UPPER EXTREMITY, INCLUDING SHOULDER: ICD-10-CM

## 2018-02-06 DIAGNOSIS — K50.10 CROHN'S DISEASE OF COLON WITHOUT COMPLICATION (HCC): ICD-10-CM

## 2018-02-06 PROCEDURE — 99214 OFFICE O/P EST MOD 30 MIN: CPT | Performed by: INTERNAL MEDICINE

## 2018-02-06 NOTE — PROGRESS NOTES
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, including shoulder       Refer to plastic surgeon Dr Saroj Abraham           Other Visit Diagnoses     Squamous cell cancer of multiple sites of skin of upper arm, left    -  Primary    Relevant Orders    Ambulatory referral to Plastic Surgery            Subjective:      Patient ID: Roxane Hodgkin is a 80 y o  female  Patient came in urgently there were concerned about the lesion in the antecubital fossae and also with the risk dorsum area of the left arm it 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 that should be removed will refer her to a plastic surgeon Dr José Fermin  She also has a lesion above the left shoulder  After     She was in the emergency room she fell and she had an extensive workup for the fall because she has some hip pain including a CT scan of the hip CT scan of the neck CT scan of the brain which were all unremarkable there were no evidence of fracture  Which is a good news  She is here with the daughter and her caretaker today in good spirits ambulating with a walker  And she is taking her vitamin-D  Review of Systems   Constitutional: Negative  Negative for activity change, appetite change, fatigue, fever and unexpected weight change  HENT: Negative for congestion, ear pain, hearing loss, mouth sores, postnasal drip, rhinorrhea, sore throat, trouble swallowing and voice change  Eyes: Negative for pain, redness and visual disturbance  Respiratory: Negative for cough, chest tightness, shortness of breath and wheezing  Cardiovascular: Negative for chest pain, palpitations and leg swelling  Gastrointestinal: Negative for abdominal distention, abdominal pain, blood in stool, constipation, diarrhea and nausea  Endocrine: Negative for cold intolerance, heat intolerance, polydipsia, polyphagia and polyuria  Genitourinary: Negative for difficulty urinating, dysuria, flank pain, frequency, hematuria and urgency  Musculoskeletal: Negative for arthralgias, back pain, gait problem, joint swelling and myalgias  Skin: Negative for color change and pallor  Neurological: Negative for dizziness, tremors, seizures, syncope, weakness, numbness and headaches  Hematological: Negative for adenopathy  Does not bruise/bleed easily  Psychiatric/Behavioral: Negative  Negative for sleep disturbance  The patient is not nervous/anxious  Objective:     Physical Exam   Constitutional: No distress  HENT:   Head: Normocephalic and atraumatic  Neck: Normal range of motion  Cardiovascular: Normal rate and regular rhythm  Pulmonary/Chest: Effort normal and breath sounds normal    Abdominal: Soft  Bowel sounds are normal    Musculoskeletal: She exhibits no edema, tenderness or deformity  He is ambulating with a walker  This is good to preve   Skin: Skin is warm  There is a lesion in the dorsum of the left risk nodule about 1 cm in diameter  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

## 2018-03-07 NOTE — PROGRESS NOTES
Dear Yohannes Amos,  My name is Karla Caputo and I am a Registered Nurse and Care Coordinator for [address]  We recently  spoke on the phone regarding your hospital stay and you opted to receive follow-up phone calls from me  Because of the reason that you were in the hospital, Medicare has placed you in a program called [facility]  One of  the benefits of the program is that you can have a nurse call regularly to answer any questions or concerns you may have  My phone number is listed below in case you would need to contact me      Sincerely,   Karla Caputo RN  [phone]            Electronically signed Flo Vega RN  Jan 25 2017  1:01PM EST Author

## 2018-03-20 ENCOUNTER — OFFICE VISIT (OUTPATIENT)
Dept: INTERNAL MEDICINE CLINIC | Facility: CLINIC | Age: 83
End: 2018-03-20
Payer: MEDICARE

## 2018-03-20 VITALS
TEMPERATURE: 98.2 F | RESPIRATION RATE: 15 BRPM | WEIGHT: 108 LBS | SYSTOLIC BLOOD PRESSURE: 129 MMHG | HEART RATE: 82 BPM | DIASTOLIC BLOOD PRESSURE: 76 MMHG | BODY MASS INDEX: 23.37 KG/M2

## 2018-03-20 DIAGNOSIS — R45.1 AGITATION: ICD-10-CM

## 2018-03-20 DIAGNOSIS — M19.91 PRIMARY OSTEOARTHRITIS, UNSPECIFIED SITE: Primary | ICD-10-CM

## 2018-03-20 DIAGNOSIS — G30.1 LATE ONSET ALZHEIMER'S DISEASE WITH BEHAVIORAL DISTURBANCE (HCC): ICD-10-CM

## 2018-03-20 DIAGNOSIS — F02.81 LATE ONSET ALZHEIMER'S DISEASE WITH BEHAVIORAL DISTURBANCE (HCC): ICD-10-CM

## 2018-03-20 DIAGNOSIS — K50.10 CROHN'S DISEASE OF COLON WITHOUT COMPLICATION (HCC): ICD-10-CM

## 2018-03-20 PROCEDURE — 99214 OFFICE O/P EST MOD 30 MIN: CPT | Performed by: INTERNAL MEDICINE

## 2018-03-20 RX ORDER — GABAPENTIN 100 MG/1
CAPSULE ORAL
Qty: 30 CAPSULE | Refills: 1 | Status: SHIPPED | OUTPATIENT
Start: 2018-03-20 | End: 2018-04-30 | Stop reason: SDUPTHER

## 2018-03-20 RX ORDER — GABAPENTIN 100 MG/1
100 CAPSULE ORAL 3 TIMES DAILY
Qty: 30 CAPSULE | Refills: 1 | Status: SHIPPED | OUTPATIENT
Start: 2018-03-20 | End: 2018-03-20 | Stop reason: SDUPTHER

## 2018-03-20 NOTE — PROGRESS NOTES
Assessment/Plan:    Late onset Alzheimer's disease with behavioral disturbance    I am going to start on gabapentin 100 mg at bedtime may increase to 3 per day for agitation and help her sleep    Crohn's colitis ([facility] [address] )   Stable no rectal bleeding    OA (osteoarthritis)   Generalized in not complain  Problem List Items Addressed This Visit     Crohn's colitis ([facility] [address] )      Stable no rectal bleeding         OA (osteoarthritis) - Primary      Generalized in not complain  Late onset Alzheimer's disease with behavioral disturbance       I am going to start on gabapentin 100 mg at bedtime may increase to 3 per day for agitation and help her sleep         Relevant Medications    gabapentin (NEURONTIN) 100 mg capsule      Other Visit Diagnoses     Agitation        Relevant Medications    gabapentin (NEURONTIN) 100 mg capsule            Subjective:      Patient ID: Zion Amaya is a 80 y o  female  Chief Complaint   Patient presents with    Dementia     dementia worsening, agitated         Current Outpatient Prescriptions:     acetaminophen (TYLENOL) 325 mg tablet, Take 650 mg by mouth every 6 (six) hours as needed for mild pain , Disp: , Rfl:     calcium carbonate (OS-KYLEE) 600 MG tablet, Take 600 mg by mouth daily  , Disp: , Rfl:     ketoconazole (NIZORAL) 2 % cream, Apply 1 application topically 2 (two) times a day, Disp: , Rfl:     mesalamine (DELZICOL) 400 mg, Take 400 mg by mouth daily  , Disp: , Rfl:     multivitamin (THERAGRAN) TABS, Take 1 tablet by mouth daily  , Disp: , Rfl:     Vitamin D, Cholecalciferol, 400 UNITS CAPS, Take 1,000 Units by mouth daily  , Disp: , Rfl:     gabapentin (NEURONTIN) 100 mg capsule, 1 or 2 capsules at bedtime for sleep and anxiety may use an extra 1 in the morning if needed, Disp: 30 capsule, Rfl: 1      Alzheimer's dementia with behavior problems having picking her skin than being anxious having problems sleeping she is here with the daughter and the caretaker I think in good medication for her will be gabapentin that she can take for sleep and anxiety the going to call me with a progress report next week and going to see her in 2 weeks for her regular office visit    Crohn's colostomy doing fine no rectal bleeding  Generalized osteoarthritis did not complain    Squamous cell carcinoma of the skin resected by the plastic surgeon recently healing quite nicely on the right forearm  The following portions of the patient's history were reviewed and updated as appropriate: allergies, current medications, past family history, past medical history, past social history, past surgical history and problem list     Review of Systems   Constitutional: Negative  Negative for activity change, appetite change, fatigue, fever and unexpected weight change  HENT: Negative for congestion, ear pain, hearing loss, mouth sores, postnasal drip, rhinorrhea, sore throat, trouble swallowing and voice change  Eyes: Negative for pain, redness and visual disturbance  Respiratory: Negative for cough, chest tightness, shortness of breath and wheezing  Cardiovascular: Negative for chest pain, palpitations and leg swelling  Gastrointestinal: Negative for abdominal distention, abdominal pain, blood in stool, constipation, diarrhea and nausea  Endocrine: Negative for cold intolerance, heat intolerance, polydipsia, polyphagia and polyuria  Genitourinary: Negative for difficulty urinating, dysuria, flank pain, frequency, hematuria and urgency  Musculoskeletal: Negative for arthralgias, back pain, gait problem, joint swelling and myalgias  Skin: Negative for color change and pallor  Neurological: Negative for dizziness, tremors, seizures, syncope, weakness, numbness and headaches  Hematological: Negative for adenopathy  Does not bruise/bleed easily  Psychiatric/Behavioral: Negative  Negative for sleep disturbance  The patient is not nervous/anxious  Objective:    /76 (BP Location: Left arm, Patient Position: Sitting, Cuff Size: Standard)   Pulse 82   Temp 98 2 °F (36 8 °C)   Resp 15   Wt 49 kg (108 lb)   BMI 23 37 kg/m²      Physical Exam   Constitutional: She is oriented to person, place, and time  She appears well-developed and well-nourished  HENT:   Head: Normocephalic  Right Ear: External ear normal    Left Ear: External ear normal    Nose: Nose normal    Mouth/Throat: Oropharynx is clear and moist  No oropharyngeal exudate  Eyes: Conjunctivae and EOM are normal  Pupils are equal, round, and reactive to light  Neck: Normal range of motion  Neck supple  No thyromegaly present  Cardiovascular: Normal rate, regular rhythm, normal heart sounds and intact distal pulses  Exam reveals no gallop and no friction rub  No murmur heard  Pulmonary/Chest: Effort normal and breath sounds normal  No respiratory distress  She has no wheezes  She has no rales  Abdominal: Soft  Bowel sounds are normal  She exhibits no distension and no mass  There is no tenderness  There is no rebound and no guarding  Musculoskeletal: Normal range of motion  Ambulates with a walker  Lymphadenopathy:     She has no cervical adenopathy  Neurological: She is alert and oriented to person, place, and time  Skin: Skin is warm and dry  Dry lesion was resected on the forearm on the left arm   Psychiatric: She has a normal mood and affect  Her behavior is normal  Judgment normal    Agitation   Nursing note and vitals reviewed

## 2018-03-20 NOTE — ASSESSMENT & PLAN NOTE
I am going to start on gabapentin 100 mg at bedtime may increase to 3 per day for agitation and help her sleep

## 2018-03-20 NOTE — PATIENT INSTRUCTIONS
Alzheimer Disease   AMBULATORY CARE:   Alzheimer disease  is an irreversible brain disorder that results in the gradual loss of memory  Parts of the brain die and cannot make normal levels of brain chemicals  The disease usually starts at about age 72 to 79 years but can start earlier  Common symptoms include the following:   · Mild AD:  Early AD symptoms may be minor and last from 1 to 3 years  ¨ Memory loss: Remembering what happened years ago, but unable to remember things from yesterday or forgetting the names of common things or people you know    ¨ Confusion about what month or season it is    ¨ Forgetting to brush your teeth or comb your hair    ¨ Difficulty taking care of your home or finances or difficulty making decisions    ¨ Loss of interest in your usual activities    ¨ Feeling depressed, angry, or confused about the changes you notice    · Moderate AD:      ¨ Problems choosing what clothes to wear, doing simple jobs, or caring for your self    ¨ Unable to recognize people familiar to you    ¨ Difficulty finding words to say what you mean, trouble talking in normal sentences, or speech that is difficult to understand    ¨ Feeling anxious, restless, and agitated at night and seeming depressed or worried    ¨ Difficulty controlling emotions and becoming loud, violent, and hard to control    ¨ Becoming confused and wandering off or pacing    ¨ Unable to plan and follow through with activities    ¨ Thinking something is true even though it is not, seeing things that are not actually there, or inability to control when you urinate    · Severe AD:      ¨ Complete loss of memory    ¨ Complete loss of speech    ¨ Loss of bladder and bowel control    ¨ Finding it very difficult to walk    ¨ Becoming angry and out of control or aggressive and destroying things    ¨ Unable to care for yourself and needing someone to take care of you  Contact your healthcare provider if:   · You have a fever      · You have a rash or your skin is itchy and swollen  · You are depressed and have difficulty coping with your symptoms  · You have questions or concerns about your condition or care  Treatment for AD  may include medicines to help increase the amount of normal chemicals in your brain or slow the death of brain cells  You may also need medicines to help control your mood or help you feel less nervous or restless  Medicines to help with bladder and bowel control or to help you sleep better may be needed  Some people may be given medicines to control delusions (false beliefs), hallucinations, or violent behaviors  Counseling can teach you ways to cope with your disease  Care for someone who has AD:   · Keep activities the same from day to day  People with AD do best with daily routines  Take breaks often  Save difficult activities for when the person seems the most alert  Choose activities that the person is interested in doing  Help the person get started and try to break difficult activities into small steps  · Keep mealtimes at the same time each day  It is best to limit food choices  Eating patterns may change in people with AD  It may help to have the person eat small meals and snacks  Ask healthcare providers about giving vitamins if you do not think the person is eating enough  · Get regular exercise  Regular exercise may help decrease depression and anxiety and improve sleep  Walking is a good exercise for people with AD  Check local senior centers for information about group exercise activities  · Learn to recognize pain or discomfort  Noisy breathing or rigid body movements may be a sign of pain  A sad or scared look may also mean the person is having discomfort  The person may also constantly make certain noises when he or she is in pain  · Provide personal care  Make sure to check the water temperature of the bath or shower so they do not burn themselves   It may help to choose and lay out clothes each night for them to wear the next day  Make sure to brush the person's teeth or dentures daily  Do not forget to take the person to the dentist for exams  · Provide a safe environment  Go through the house and remove things that could cause accidents  Make sure household  and medicines are out of reach  You may need to remove the stove burner knobs and hide matches and lighters to prevent injury  Remove loose rugs to prevent falls  Keep doors and windows tightly closed to prevent wandering or other injuries  If the person smokes, make sure cigarettes are always put out  · Keep the person with AD active and awake during the day  Limit how much liquid the person drinks in the evening  This may help him or her sleep through the night  Make sure that the person goes to bed at the same time every night  · Use short words or sentences when speaking to the person with AD  Call the person by name and speak slowly, clearly, and calmly  Use short words and sentences  Use the same words if you have to repeat yourself  Do not ask too many questions  Do not argue with the person  · Create a bathroom schedule  This may mean reminding the person to urinate every 4 hours  Be aware of the person's bowel movement routine and keep it the same  · Prevent wandering  New door locks may be needed to keep the person from going outside alone  An alarm system near doors may tell you if the person wanders out  Have the person wear a necklace or bracelet with their name and phone number on it in case they wander away from you and are found by someone else  Follow up with your healthcare provider as directed:  Ask someone to go with you to help you remember what your healthcare provider tells you  The person can take notes for you during the visit and go over the notes with you later  Write down your questions so you remember to ask them during your visits    © 2017 [facility]  is for End User's use only and may not be sold, redistributed or otherwise used for commercial purposes  All illustrations and images included in CareNotes® are the copyrighted property of [employer]  or [address]  The above information is an  only  It is not intended as medical advice for individual conditions or treatments  Talk to your doctor, nurse or pharmacist before following any medical regimen to see if it is safe and effective for you  We will use gabapentin to help her sleep and to help her anxiety  She can take up to 3 tablets a day 1 or 2 at bedtime and 1 in the morning

## 2018-04-03 ENCOUNTER — OFFICE VISIT (OUTPATIENT)
Dept: INTERNAL MEDICINE CLINIC | Facility: CLINIC | Age: 83
End: 2018-04-03
Payer: MEDICARE

## 2018-04-03 VITALS
RESPIRATION RATE: 15 BRPM | BODY MASS INDEX: 23.8 KG/M2 | SYSTOLIC BLOOD PRESSURE: 110 MMHG | WEIGHT: 110 LBS | TEMPERATURE: 98.2 F | DIASTOLIC BLOOD PRESSURE: 70 MMHG | HEART RATE: 97 BPM

## 2018-04-03 DIAGNOSIS — F02.81 LATE ONSET ALZHEIMER'S DISEASE WITH BEHAVIORAL DISTURBANCE (HCC): Primary | ICD-10-CM

## 2018-04-03 DIAGNOSIS — K50.10 CROHN'S DISEASE OF COLON WITHOUT COMPLICATION (HCC): ICD-10-CM

## 2018-04-03 DIAGNOSIS — M19.91 PRIMARY OSTEOARTHRITIS, UNSPECIFIED SITE: ICD-10-CM

## 2018-04-03 DIAGNOSIS — G30.1 LATE ONSET ALZHEIMER'S DISEASE WITH BEHAVIORAL DISTURBANCE (HCC): Primary | ICD-10-CM

## 2018-04-03 PROCEDURE — 99214 OFFICE O/P EST MOD 30 MIN: CPT | Performed by: INTERNAL MEDICINE

## 2018-04-03 NOTE — PROGRESS NOTES
Assessment/Plan:    Crohn's colitis ([facility] )    Remission no hematemesis hematochezia  Late onset Alzheimer's disease with behavioral disturbance    Gabapentin  work well for her anxiety    OA (osteoarthritis)   Did not complain of pain may use Tylenol ambulates with a walker         Problem List Items Addressed This Visit     Crohn's colitis ([facility] )       Remission no hematemesis hematochezia  OA (osteoarthritis)      Did not complain of pain may use Tylenol ambulates with a walker         Late onset Alzheimer's disease with behavioral disturbance - Primary       Gabapentin  work well for her anxiety                 Subjective:      Patient ID: Karine Hernandez is a 80 y o  female  Chief Complaint   Patient presents with    Follow-up     2 falls in the past year, she has urinary incontinence           Current Outpatient Prescriptions:     acetaminophen (TYLENOL) 325 mg tablet, Take 650 mg by mouth every 6 (six) hours as needed for mild pain , Disp: , Rfl:     calcium carbonate (OS-KYLEE) 600 MG tablet, Take 600 mg by mouth daily  , Disp: , Rfl:     gabapentin (NEURONTIN) 100 mg capsule, 1 or 2 capsules at bedtime for sleep and anxiety may use an extra 1 in the morning if needed, Disp: 30 capsule, Rfl: 1    ketoconazole (NIZORAL) 2 % cream, Apply 1 application topically 2 (two) times a day, Disp: , Rfl:     mesalamine (DELZICOL) 400 mg, Take 400 mg by mouth daily  , Disp: , Rfl:     multivitamin (THERAGRAN) TABS, Take 1 tablet by mouth daily  , Disp: , Rfl:     Vitamin D, Cholecalciferol, 400 UNITS CAPS, Take 1,000 Units by mouth daily  , Disp: , Rfl:       Problem 1  Dementia anxiety responded well to gabapentin they are only giving her 100 mg in the afternoon I told her they could increase it to 300 a day if needed  She looks great she is not anxious  Dermatophyte the rash has resolved with the anus or rale cream she can use it on a p r n   Basis    Crohn's no diarrhea no hematemesis hematochezia she gained 2 lb  Ambulates with a walker is been no fall since I saw her last         The following portions of the patient's history were reviewed and updated as appropriate: allergies, current medications, past family history, past medical history, past social history, past surgical history and problem list     Review of Systems   Constitutional: Negative  Negative for activity change, appetite change, fatigue, fever and unexpected weight change  HENT: Negative for congestion, ear pain, hearing loss, mouth sores, postnasal drip, rhinorrhea, sore throat, trouble swallowing and voice change  Eyes: Negative for pain, redness and visual disturbance  Respiratory: Negative for cough, chest tightness, shortness of breath and wheezing  Cardiovascular: Negative for chest pain, palpitations and leg swelling  Gastrointestinal: Negative for abdominal distention, abdominal pain, blood in stool, constipation, diarrhea and nausea  Endocrine: Negative for cold intolerance, heat intolerance, polydipsia, polyphagia and polyuria  Genitourinary: Negative for difficulty urinating, dysuria, flank pain, frequency, hematuria and urgency  Musculoskeletal: Negative for arthralgias, back pain, gait problem, joint swelling and myalgias  Skin: Negative for color change and pallor  Neurological: Negative for dizziness, tremors, seizures, syncope, weakness, numbness and headaches  Hematological: Negative for adenopathy  Does not bruise/bleed easily  Psychiatric/Behavioral: Negative  Negative for sleep disturbance  The patient is not nervous/anxious            Objective:    Results for orders placed or performed during the hospital encounter of 02/03/18   CBC and differential   Result Value Ref Range    WBC 11 88 (H) 4 31 - 10 16 Thousand/uL    RBC 4 34 3 81 - 5 12 Million/uL    Hemoglobin 13 2 11 5 - 15 4 g/dL    Hematocrit 40 2 34 8 - 46 1 %    MCV 93 82 - 98 fL    MCH 30 4 26 8 - 34 3 pg    MCHC 32 8 31 4 - 37 4 g/dL    RDW 14 9 11 6 - 15 1 %    MPV 10 1 8 9 - 12 7 fL    Platelets 114 114 - 340 Thousands/uL    nRBC 0 /100 WBCs    Neutrophils Relative 82 (H) 43 - 75 %    Lymphocytes Relative 8 (L) 14 - 44 %    Monocytes Relative 8 4 - 12 %    Eosinophils Relative 2 0 - 6 %    Basophils Relative 0 0 - 1 %    Neutrophils Absolute 9 69 (H) 1 85 - 7 62 Thousands/µL    Lymphocytes Absolute 1 00 0 60 - 4 47 Thousands/µL    Monocytes Absolute 0 95 0 17 - 1 22 Thousand/µL    Eosinophils Absolute 0 21 0 00 - 0 61 Thousand/µL    Basophils Absolute 0 03 0 00 - 0 10 Thousands/µL   Protime-INR   Result Value Ref Range    Protime 12 8 12 1 - 14 4 seconds    INR 0 96 0 86 - 1 16   APTT   Result Value Ref Range    PTT 26 23 - 35 seconds   Basic metabolic panel   Result Value Ref Range    Sodium 143 136 - 145 mmol/L    Potassium 3 9 3 5 - 5 3 mmol/L    Chloride 106 100 - 108 mmol/L    CO2 30 21 - 32 mmol/L    Anion Gap 7 4 - 13 mmol/L    BUN 20 5 - 25 mg/dL    Creatinine 1 07 0 60 - 1 30 mg/dL    Glucose 108 65 - 140 mg/dL    Calcium 9 0 8 3 - 10 1 mg/dL    eGFR 45 ml/min/1 73sq m   CK Total with Reflex CKMB   Result Value Ref Range    Total CK 73 26 - 192 U/L       /70 (BP Location: Left arm, Patient Position: Sitting, Cuff Size: Standard)   Pulse 97   Temp 98 2 °F (36 8 °C)   Resp 15   Wt 49 9 kg (110 lb)   BMI 23 80 kg/m²      Physical Exam   Constitutional: She is oriented to person, place, and time  She appears well-developed and well-nourished  HENT:   Head: Normocephalic  Right Ear: External ear normal    Left Ear: External ear normal    Nose: Nose normal    Mouth/Throat: Oropharynx is clear and moist  No oropharyngeal exudate  Eyes: Conjunctivae and EOM are normal  Pupils are equal, round, and reactive to light  Neck: Normal range of motion  Neck supple  No thyromegaly present  Cardiovascular: Normal rate, regular rhythm, normal heart sounds and intact distal pulses  Exam reveals no gallop and no friction rub  No murmur heard  no dependent edema   Pulmonary/Chest: Effort normal and breath sounds normal  No respiratory distress  She has no wheezes  She has no rales  Abdominal: Soft  Bowel sounds are normal  She exhibits no distension and no mass  There is no tenderness  There is no rebound and no guarding  Colostomy bag  Functioning no abdominal tenderness  Musculoskeletal: Normal range of motion  She exhibits no edema, tenderness or deformity  Lymphadenopathy:     She has no cervical adenopathy  Neurological: She is alert and oriented to person, place, and time  Skin: Skin is warm and dry  Psychiatric: She has a normal mood and affect  Her behavior is normal  Judgment normal    Dementia and confused but less anxious on gabapentin   Nursing note and vitals reviewed

## 2018-04-03 NOTE — PATIENT INSTRUCTIONS
Anxiety has improved with the gabapentin will continue taking that you may use at 100 mg 3 times a day as needed if you need to  Continue uses the  nizoral  cream as needed

## 2018-04-30 DIAGNOSIS — R45.1 AGITATION: ICD-10-CM

## 2018-04-30 DIAGNOSIS — F02.81 LATE ONSET ALZHEIMER'S DISEASE WITH BEHAVIORAL DISTURBANCE (HCC): ICD-10-CM

## 2018-04-30 DIAGNOSIS — G30.1 LATE ONSET ALZHEIMER'S DISEASE WITH BEHAVIORAL DISTURBANCE (HCC): ICD-10-CM

## 2018-04-30 RX ORDER — GABAPENTIN 100 MG/1
CAPSULE ORAL
Qty: 30 CAPSULE | Refills: 1 | Status: SHIPPED | OUTPATIENT
Start: 2018-04-30 | End: 2018-05-21 | Stop reason: SDUPTHER

## 2018-05-21 ENCOUNTER — OFFICE VISIT (OUTPATIENT)
Dept: INTERNAL MEDICINE CLINIC | Facility: CLINIC | Age: 83
End: 2018-05-21
Payer: MEDICARE

## 2018-05-21 VITALS
SYSTOLIC BLOOD PRESSURE: 124 MMHG | BODY MASS INDEX: 23.37 KG/M2 | RESPIRATION RATE: 15 BRPM | TEMPERATURE: 97.5 F | WEIGHT: 108 LBS | DIASTOLIC BLOOD PRESSURE: 70 MMHG | HEART RATE: 87 BPM

## 2018-05-21 DIAGNOSIS — R45.1 AGITATION: ICD-10-CM

## 2018-05-21 DIAGNOSIS — C44.629 SQUAMOUS CELL CARCINOMA OF SKIN OF LEFT UPPER EXTREMITY, INCLUDING SHOULDER: ICD-10-CM

## 2018-05-21 DIAGNOSIS — K50.10 CROHN'S DISEASE OF COLON WITHOUT COMPLICATION (HCC): Primary | ICD-10-CM

## 2018-05-21 DIAGNOSIS — B35.9 DERMATOPHYTOSES: ICD-10-CM

## 2018-05-21 DIAGNOSIS — G30.1 LATE ONSET ALZHEIMER'S DISEASE WITH BEHAVIORAL DISTURBANCE (HCC): ICD-10-CM

## 2018-05-21 DIAGNOSIS — F02.81 LATE ONSET ALZHEIMER'S DISEASE WITH BEHAVIORAL DISTURBANCE (HCC): ICD-10-CM

## 2018-05-21 DIAGNOSIS — M19.91 PRIMARY OSTEOARTHRITIS, UNSPECIFIED SITE: ICD-10-CM

## 2018-05-21 PROBLEM — B36.9 FUNGAL DERMATITIS: Status: ACTIVE | Noted: 2018-05-21

## 2018-05-21 PROCEDURE — 99214 OFFICE O/P EST MOD 30 MIN: CPT | Performed by: INTERNAL MEDICINE

## 2018-05-21 RX ORDER — GABAPENTIN 100 MG/1
CAPSULE ORAL
Qty: 100 CAPSULE | Refills: 3 | Status: SHIPPED | OUTPATIENT
Start: 2018-05-21 | End: 2018-11-20 | Stop reason: SDUPTHER

## 2018-05-21 RX ORDER — KETOCONAZOLE 20 MG/G
1 CREAM TOPICAL 2 TIMES DAILY
Qty: 15 G | Refills: 0 | Status: SHIPPED | OUTPATIENT
Start: 2018-05-21 | End: 2019-04-05 | Stop reason: SDUPTHER

## 2018-05-21 NOTE — PROGRESS NOTES
Assessment/Plan:    Crohn's colitis ([facility] )   Colitis in remission  Late onset Alzheimer's disease with behavioral disturbance    Continue gabapentin for agitation concerning her dementia    OA (osteoarthritis)   Did not complain using a walker for gait dysfunction  No falls    Fungal dermatitis   Mostly in the groin continue using the nizoral cream          Problem List Items Addressed This Visit     Crohn's colitis ([facility] ) - Primary      Colitis in remission  Relevant Orders    CBC and differential    TSH, 3rd generation with T4 reflex    Comprehensive metabolic panel    Vitamin D 25 hydroxy    OA (osteoarthritis)      Did not complain using a walker for gait dysfunction  No falls         Relevant Orders    CBC and differential    TSH, 3rd generation with T4 reflex    Comprehensive metabolic panel    Vitamin D 25 hydroxy    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, including shoulder    Relevant Medications    ketoconazole (NIZORAL) 2 % cream    Late onset Alzheimer's disease with behavioral disturbance       Continue gabapentin for agitation concerning her dementia         Relevant Medications    gabapentin (NEURONTIN) 100 mg capsule    Other Relevant Orders    CBC and differential    TSH, 3rd generation with T4 reflex    Comprehensive metabolic panel    Vitamin D 25 hydroxy      Other Visit Diagnoses     Agitation        Relevant Medications    gabapentin (NEURONTIN) 100 mg capsule    Other Relevant Orders    CBC and differential    TSH, 3rd generation with T4 reflex    Comprehensive metabolic panel    Vitamin D 25 hydroxy    Dermatophytoses        Relevant Medications    ketoconazole (NIZORAL) 2 % cream            Subjective:      Patient ID: Evelin Virgen is a 80 y o  female  Chief Complaint   Patient presents with    Follow-up         Current Outpatient Prescriptions:     acetaminophen (TYLENOL) 325 mg tablet, Take 650 mg by mouth every 6 (six) hours as needed for mild pain  , Disp: , Rfl:     calcium carbonate (OS-KYLEE) 600 MG tablet, Take 600 mg by mouth daily  , Disp: , Rfl:     gabapentin (NEURONTIN) 100 mg capsule, Take 1 capsule in the morning and 1 or 2 capsules at bedtime for up to 3 capsules per day, Disp: 100 capsule, Rfl: 3    ketoconazole (NIZORAL) 2 % cream, Apply 1 application topically 2 (two) times a day, Disp: 15 g, Rfl: 0    mesalamine (DELZICOL) 400 mg, Take 400 mg by mouth daily  , Disp: , Rfl:     multivitamin (THERAGRAN) TABS, Take 1 tablet by mouth daily  , Disp: , Rfl:     Vitamin D, Cholecalciferol, 400 UNITS CAPS, Take 1,000 Units by mouth daily  , Disp: , Rfl:      Problem 1  Crohn colitis no rectal bleeding no abdominal pain weight is stable  Problem 2  Dementia Alzheimer's supportive with the caretaker and the daughter gabapentin seems to help her sleeping pattern and her anxiety continue the same I told him they could use up to 300 mg per day    Gait dysfunction DJD uses a walker is had no falls which is excellent  History of a fungal in for a dermatitis using nizoral cream continue the same  I did examine the groin area is difficult because she is incontinent of urine  The following portions of the patient's history were reviewed and updated as appropriate: allergies, current medications, past family history, past medical history, past social history, past surgical history and problem list     Review of Systems   Constitutional: Negative  Negative for activity change, appetite change, fatigue, fever and unexpected weight change  HENT: Negative for congestion, ear pain, hearing loss, mouth sores, postnasal drip, rhinorrhea, sore throat, trouble swallowing and voice change  Eyes: Negative for pain, redness and visual disturbance  Respiratory: Negative for cough, chest tightness, shortness of breath and wheezing  Cardiovascular: Negative for chest pain, palpitations and leg swelling     Gastrointestinal: Negative for abdominal distention, abdominal pain, blood in stool, constipation, diarrhea and nausea  Endocrine: Negative for cold intolerance, heat intolerance, polydipsia, polyphagia and polyuria  Genitourinary: Negative for difficulty urinating, dysuria, flank pain, frequency, hematuria and urgency  Musculoskeletal: Negative for arthralgias, back pain, gait problem, joint swelling and myalgias  Skin: Negative for color change and pallor  Neurological: Negative for dizziness, tremors, seizures, syncope, weakness, numbness and headaches  Hematological: Negative for adenopathy  Does not bruise/bleed easily  Psychiatric/Behavioral: Negative  Negative for sleep disturbance  The patient is not nervous/anxious            Objective:    Results for orders placed or performed during the hospital encounter of 02/03/18   CBC and differential   Result Value Ref Range    WBC 11 88 (H) 4 31 - 10 16 Thousand/uL    RBC 4 34 3 81 - 5 12 Million/uL    Hemoglobin 13 2 11 5 - 15 4 g/dL    Hematocrit 40 2 34 8 - 46 1 %    MCV 93 82 - 98 fL    MCH 30 4 26 8 - 34 3 pg    MCHC 32 8 31 4 - 37 4 g/dL    RDW 14 9 11 6 - 15 1 %    MPV 10 1 8 9 - 12 7 fL    Platelets 114 015 - 891 Thousands/uL    nRBC 0 /100 WBCs    Neutrophils Relative 82 (H) 43 - 75 %    Lymphocytes Relative 8 (L) 14 - 44 %    Monocytes Relative 8 4 - 12 %    Eosinophils Relative 2 0 - 6 %    Basophils Relative 0 0 - 1 %    Neutrophils Absolute 9 69 (H) 1 85 - 7 62 Thousands/µL    Lymphocytes Absolute 1 00 0 60 - 4 47 Thousands/µL    Monocytes Absolute 0 95 0 17 - 1 22 Thousand/µL    Eosinophils Absolute 0 21 0 00 - 0 61 Thousand/µL    Basophils Absolute 0 03 0 00 - 0 10 Thousands/µL   Protime-INR   Result Value Ref Range    Protime 12 8 12 1 - 14 4 seconds    INR 0 96 0 86 - 1 16   APTT   Result Value Ref Range    PTT 26 23 - 35 seconds   Basic metabolic panel   Result Value Ref Range    Sodium 143 136 - 145 mmol/L    Potassium 3 9 3 5 - 5 3 mmol/L    Chloride 106 100 - 108 mmol/L CO2 30 21 - 32 mmol/L    Anion Gap 7 4 - 13 mmol/L    BUN 20 5 - 25 mg/dL    Creatinine 1 07 0 60 - 1 30 mg/dL    Glucose 108 65 - 140 mg/dL    Calcium 9 0 8 3 - 10 1 mg/dL    eGFR 45 ml/min/1 73sq m   CK Total with Reflex CKMB   Result Value Ref Range    Total CK 73 26 - 192 U/L       /70 (BP Location: Left arm, Patient Position: Sitting, Cuff Size: Standard)   Pulse 87   Temp 97 5 °F (36 4 °C)   Resp 15   Wt 49 kg (108 lb)   BMI 23 37 kg/m²      Physical Exam   Constitutional: She is oriented to person, place, and time  She appears well-developed and well-nourished  HENT:   Head: Normocephalic  Right Ear: External ear normal    Left Ear: External ear normal    Nose: Nose normal    Mouth/Throat: Oropharynx is clear and moist  No oropharyngeal exudate  Eyes: Conjunctivae and EOM are normal  Pupils are equal, round, and reactive to light  Neck: Normal range of motion  Neck supple  No thyromegaly present  Cardiovascular: Normal rate, regular rhythm, normal heart sounds and intact distal pulses  Exam reveals no gallop and no friction rub  No murmur heard  Pulmonary/Chest: Effort normal and breath sounds normal  No respiratory distress  She has no wheezes  She has no rales  Abdominal: Soft  Bowel sounds are normal  She exhibits no distension and no mass  There is no tenderness  There is no rebound and no guarding  Musculoskeletal: Normal range of motion  Lymphadenopathy:     She has no cervical adenopathy  Neurological: She is alert and oriented to person, place, and time  Skin: Skin is warm and dry  There is erythema  Psychiatric: She has a normal mood and affect  Her behavior is normal  Judgment normal    Nursing note and vitals reviewed

## 2018-07-05 ENCOUNTER — TELEPHONE (OUTPATIENT)
Dept: INTERNAL MEDICINE CLINIC | Facility: CLINIC | Age: 83
End: 2018-07-05

## 2018-07-05 DIAGNOSIS — N30.00 ACUTE CYSTITIS WITHOUT HEMATURIA: Primary | ICD-10-CM

## 2018-07-05 RX ORDER — CEPHALEXIN 500 MG/1
500 CAPSULE ORAL EVERY 12 HOURS SCHEDULED
Qty: 14 CAPSULE | Refills: 0 | Status: SHIPPED | OUTPATIENT
Start: 2018-07-05 | End: 2018-07-12

## 2018-07-05 NOTE — TELEPHONE ENCOUNTER
PC from [facility] Anupama Claros re: Aiyana Thomas with urinary frequency, discomfort voiding, no fever, no bleeding, for 2 days  She is Allergic to Sulfa  I spoke to DR Lizbeth Mcclellan who will send Keflex to her Pharmacy  [facility]   Notified & understands

## 2018-09-10 DIAGNOSIS — K50.812 CROHN'S DISEASE OF BOTH SMALL AND LARGE INTESTINE WITH INTESTINAL OBSTRUCTION (HCC): Primary | ICD-10-CM

## 2018-09-10 RX ORDER — MESALAMINE 400 MG/1
CAPSULE, DELAYED RELEASE ORAL
Qty: 90 CAPSULE | Refills: 2 | Status: SHIPPED | OUTPATIENT
Start: 2018-09-10 | End: 2019-01-30 | Stop reason: ALTCHOICE

## 2018-09-24 ENCOUNTER — APPOINTMENT (OUTPATIENT)
Dept: LAB | Facility: CLINIC | Age: 83
End: 2018-09-24
Payer: MEDICARE

## 2018-09-24 DIAGNOSIS — B02.9 ZOSTER WITHOUT COMPLICATIONS: ICD-10-CM

## 2018-09-24 LAB
ALBUMIN SERPL BCP-MCNC: 3.4 G/DL (ref 3.5–5)
ALP SERPL-CCNC: 80 U/L (ref 46–116)
ALT SERPL W P-5'-P-CCNC: 14 U/L (ref 12–78)
ANION GAP SERPL CALCULATED.3IONS-SCNC: 7 MMOL/L (ref 4–13)
AST SERPL W P-5'-P-CCNC: 16 U/L (ref 5–45)
BASOPHILS # BLD AUTO: 0.03 THOUSANDS/ΜL (ref 0–0.1)
BASOPHILS NFR BLD AUTO: 0 % (ref 0–1)
BILIRUB SERPL-MCNC: 0.6 MG/DL (ref 0.2–1)
BUN SERPL-MCNC: 14 MG/DL (ref 5–25)
CALCIUM SERPL-MCNC: 9.6 MG/DL (ref 8.3–10.1)
CHLORIDE SERPL-SCNC: 106 MMOL/L (ref 100–108)
CO2 SERPL-SCNC: 29 MMOL/L (ref 21–32)
CREAT SERPL-MCNC: 0.98 MG/DL (ref 0.6–1.3)
EOSINOPHIL # BLD AUTO: 0.1 THOUSAND/ΜL (ref 0–0.61)
EOSINOPHIL NFR BLD AUTO: 2 % (ref 0–6)
ERYTHROCYTE [DISTWIDTH] IN BLOOD BY AUTOMATED COUNT: 13.8 % (ref 11.6–15.1)
GFR SERPL CREATININE-BSD FRML MDRD: 50 ML/MIN/1.73SQ M
GGT SERPL-CCNC: 8 U/L (ref 5–85)
GLUCOSE SERPL-MCNC: 122 MG/DL (ref 65–140)
HCT VFR BLD AUTO: 44.6 % (ref 34.8–46.1)
HGB BLD-MCNC: 14.1 G/DL (ref 11.5–15.4)
IMM GRANULOCYTES # BLD AUTO: 0.03 THOUSAND/UL (ref 0–0.2)
IMM GRANULOCYTES NFR BLD AUTO: 0 % (ref 0–2)
LYMPHOCYTES # BLD AUTO: 1.51 THOUSANDS/ΜL (ref 0.6–4.47)
LYMPHOCYTES NFR BLD AUTO: 22 % (ref 14–44)
MCH RBC QN AUTO: 29.9 PG (ref 26.8–34.3)
MCHC RBC AUTO-ENTMCNC: 31.6 G/DL (ref 31.4–37.4)
MCV RBC AUTO: 95 FL (ref 82–98)
MONOCYTES # BLD AUTO: 0.51 THOUSAND/ΜL (ref 0.17–1.22)
MONOCYTES NFR BLD AUTO: 8 % (ref 4–12)
NEUTROPHILS # BLD AUTO: 4.61 THOUSANDS/ΜL (ref 1.85–7.62)
NEUTS SEG NFR BLD AUTO: 68 % (ref 43–75)
NRBC BLD AUTO-RTO: 0 /100 WBCS
PLATELET # BLD AUTO: 269 THOUSANDS/UL (ref 149–390)
PMV BLD AUTO: 10.5 FL (ref 8.9–12.7)
POTASSIUM SERPL-SCNC: 3.7 MMOL/L (ref 3.5–5.3)
PROT SERPL-MCNC: 7.5 G/DL (ref 6.4–8.2)
RBC # BLD AUTO: 4.72 MILLION/UL (ref 3.81–5.12)
SODIUM SERPL-SCNC: 142 MMOL/L (ref 136–145)
WBC # BLD AUTO: 6.79 THOUSAND/UL (ref 4.31–10.16)

## 2018-09-24 PROCEDURE — 85025 COMPLETE CBC W/AUTO DIFF WBC: CPT

## 2018-09-24 PROCEDURE — 80053 COMPREHEN METABOLIC PANEL: CPT

## 2018-09-24 PROCEDURE — 36415 COLL VENOUS BLD VENIPUNCTURE: CPT

## 2018-09-24 PROCEDURE — 82977 ASSAY OF GGT: CPT

## 2018-09-26 ENCOUNTER — OFFICE VISIT (OUTPATIENT)
Dept: INTERNAL MEDICINE CLINIC | Facility: CLINIC | Age: 83
End: 2018-09-26
Payer: MEDICARE

## 2018-09-26 VITALS
SYSTOLIC BLOOD PRESSURE: 136 MMHG | TEMPERATURE: 98.5 F | DIASTOLIC BLOOD PRESSURE: 78 MMHG | HEART RATE: 106 BPM | WEIGHT: 108 LBS | BODY MASS INDEX: 23.37 KG/M2 | RESPIRATION RATE: 15 BRPM

## 2018-09-26 DIAGNOSIS — F02.81 LATE ONSET ALZHEIMER'S DISEASE WITH BEHAVIORAL DISTURBANCE (HCC): ICD-10-CM

## 2018-09-26 DIAGNOSIS — M19.91 PRIMARY OSTEOARTHRITIS, UNSPECIFIED SITE: ICD-10-CM

## 2018-09-26 DIAGNOSIS — Z23 NEED FOR INFLUENZA VACCINATION: ICD-10-CM

## 2018-09-26 DIAGNOSIS — K50.10 CROHN'S DISEASE OF COLON WITHOUT COMPLICATION (HCC): Primary | ICD-10-CM

## 2018-09-26 DIAGNOSIS — G30.1 LATE ONSET ALZHEIMER'S DISEASE WITH BEHAVIORAL DISTURBANCE (HCC): ICD-10-CM

## 2018-09-26 DIAGNOSIS — C44.629 SQUAMOUS CELL CARCINOMA OF SKIN OF LEFT UPPER EXTREMITY, INCLUDING SHOULDER: ICD-10-CM

## 2018-09-26 PROCEDURE — 99214 OFFICE O/P EST MOD 30 MIN: CPT | Performed by: INTERNAL MEDICINE

## 2018-09-26 PROCEDURE — G0008 ADMIN INFLUENZA VIRUS VAC: HCPCS | Performed by: INTERNAL MEDICINE

## 2018-09-26 PROCEDURE — 90662 IIV NO PRSV INCREASED AG IM: CPT | Performed by: INTERNAL MEDICINE

## 2018-09-26 NOTE — ASSESSMENT & PLAN NOTE
Resection of the Forum a squamous cell looks great no recurrence she has an abrasion in the nose that the going to watch

## 2018-09-26 NOTE — PROGRESS NOTES
Assessment/Plan:  Doing well no change in medication no lab work has been ordered for the next office visit    Crohn's colitis ([facility] [address] )  No problems with her bowel movements  No abdominal pain or fever or bleeding    Late onset Alzheimer's disease with behavioral disturbance  The mentions progressive she is happy her gabapentin is helping her sleep I told her to the can use gabapentin during the day for behavior agitation she is taking gabapentin 2 at bedtime 100 she can take an extra 200 during the day if needed for total 400 per day  OA (osteoarthritis)  Not complain as far as the joint pain she ambulates with a walker no history of falls    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, including shoulder  Resection of the Forum a squamous cell looks great no recurrence she has an abrasion in the nose that the going to watch       Diagnoses and all orders for this visit:    Crohn's disease of colon without complication ([facility] [address] )    Late onset Alzheimer's disease with behavioral disturbance    Primary osteoarthritis, unspecified site    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, including shoulder    Need for influenza vaccination  -     influenza vaccine, 3791-6156, high-dose, PF 0 5 mL, for patients 65 yr+ (FLUZONE HIGH-DOSE)          Subjective:      Patient ID: Iris Friedmantingham is a 80 y o  female  Chief Complaint   Patient presents with    Follow-up         Current Outpatient Prescriptions:     acetaminophen (TYLENOL) 325 mg tablet, Take 650 mg by mouth every 6 (six) hours as needed for mild pain , Disp: , Rfl:     calcium carbonate (OS-KYLEE) 600 MG tablet, Take 600 mg by mouth daily  , Disp: , Rfl:     DELZICOL 400 MG, TAKE 1 CAPSULE THREE TIMES A DAY, Disp: 90 capsule, Rfl: 2    gabapentin (NEURONTIN) 100 mg capsule, Take 1 capsule in the morning and 1 or 2 capsules at bedtime for up to 3 capsules per day, Disp: 100 capsule, Rfl: 3    ketoconazole (NIZORAL) 2 % cream, Apply 1 application topically 2 (two) times a day, Disp: 15 g, Rfl: 0    multivitamin (THERAGRAN) TABS, Take 1 tablet by mouth daily  , Disp: , Rfl:     Vitamin D, Cholecalciferol, 400 UNITS CAPS, Take 1,000 Units by mouth daily  , Disp: , Rfl:     HPI    The following portions of the patient's history were reviewed and updated as appropriate: allergies, current medications, past family history, past medical history, past social history, past surgical history and problem list     Review of Systems   Constitutional: Negative  Negative for activity change, appetite change, fatigue, fever and unexpected weight change  HENT: Negative for congestion, ear pain, hearing loss, mouth sores, postnasal drip, rhinorrhea, sore throat, trouble swallowing and voice change  Eyes: Negative for pain, redness and visual disturbance  Respiratory: Negative for cough, chest tightness, shortness of breath and wheezing  Cardiovascular: Negative for chest pain, palpitations and leg swelling  Gastrointestinal: Negative for abdominal distention, abdominal pain, blood in stool, constipation, diarrhea and nausea  Endocrine: Negative for cold intolerance, heat intolerance, polydipsia, polyphagia and polyuria  Genitourinary: Negative for difficulty urinating, dysuria, flank pain, frequency, hematuria and urgency  Musculoskeletal: Negative for arthralgias, back pain, gait problem, joint swelling and myalgias  Skin: Negative for color change and pallor  Neurological: Negative for dizziness, tremors, seizures, syncope, weakness, numbness and headaches  Hematological: Negative for adenopathy  Does not bruise/bleed easily  Psychiatric/Behavioral: Negative  Negative for sleep disturbance  The patient is not nervous/anxious            Objective:    Results for orders placed or performed in visit on 09/24/18   CBC and differential   Result Value Ref Range    WBC 6 79 4 31 - 10 16 Thousand/uL    RBC 4 72 3 81 - 5 12 Million/uL Hemoglobin 14 1 11 5 - 15 4 g/dL    Hematocrit 44 6 34 8 - 46 1 %    MCV 95 82 - 98 fL    MCH 29 9 26 8 - 34 3 pg    MCHC 31 6 31 4 - 37 4 g/dL    RDW 13 8 11 6 - 15 1 %    MPV 10 5 8 9 - 12 7 fL    Platelets 778 751 - 792 Thousands/uL    nRBC 0 /100 WBCs    Neutrophils Relative 68 43 - 75 %    Immat GRANS % 0 0 - 2 %    Lymphocytes Relative 22 14 - 44 %    Monocytes Relative 8 4 - 12 %    Eosinophils Relative 2 0 - 6 %    Basophils Relative 0 0 - 1 %    Neutrophils Absolute 4 61 1 85 - 7 62 Thousands/µL    Immature Grans Absolute 0 03 0 00 - 0 20 Thousand/uL    Lymphocytes Absolute 1 51 0 60 - 4 47 Thousands/µL    Monocytes Absolute 0 51 0 17 - 1 22 Thousand/µL    Eosinophils Absolute 0 10 0 00 - 0 61 Thousand/µL    Basophils Absolute 0 03 0 00 - 0 10 Thousands/µL   Comprehensive metabolic panel   Result Value Ref Range    Sodium 142 136 - 145 mmol/L    Potassium 3 7 3 5 - 5 3 mmol/L    Chloride 106 100 - 108 mmol/L    CO2 29 21 - 32 mmol/L    ANION GAP 7 4 - 13 mmol/L    BUN 14 5 - 25 mg/dL    Creatinine 0 98 0 60 - 1 30 mg/dL    Glucose 122 65 - 140 mg/dL    Calcium 9 6 8 3 - 10 1 mg/dL    AST 16 5 - 45 U/L    ALT 14 12 - 78 U/L    Alkaline Phosphatase 80 46 - 116 U/L    Total Protein 7 5 6 4 - 8 2 g/dL    Albumin 3 4 (L) 3 5 - 5 0 g/dL    Total Bilirubin 0 60 0 20 - 1 00 mg/dL    eGFR 50 ml/min/1 73sq m   Gamma GT   Result Value Ref Range    GGT 8 5 - 85 U/L       /78 (BP Location: Left arm, Patient Position: Sitting, Cuff Size: Standard)   Pulse (!) 106   Temp 98 5 °F (36 9 °C)   Resp 15   Wt 49 kg (108 lb)   BMI 23 37 kg/m²      Physical Exam   Constitutional: She is oriented to person, place, and time  She appears well-developed and well-nourished  HENT:   Head: Normocephalic  Right Ear: External ear normal    Left Ear: External ear normal    Nose: Nose normal    Mouth/Throat: Oropharynx is clear and moist  No oropharyngeal exudate     Tympanic members clear she wears bilateral hearing aids Eyes: Conjunctivae and EOM are normal  Pupils are equal, round, and reactive to light  Neck: Normal range of motion  Neck supple  No thyromegaly present  Cardiovascular: Normal rate, regular rhythm, normal heart sounds and intact distal pulses  Exam reveals no gallop and no friction rub  No murmur heard  S1-S2 no gallops  Extremities no edema   Pulmonary/Chest: Effort normal and breath sounds normal  No respiratory distress  She has no wheezes  She has no rales  Lungs are clear   Abdominal: Soft  Bowel sounds are normal  She exhibits no distension and no mass  There is no tenderness  There is no rebound and no guarding  Abdomen nontender   Musculoskeletal: Normal range of motion  Lymphadenopathy:     She has no cervical adenopathy  Neurological: She is alert and oriented to person, place, and time  Skin: Skin is warm and dry  Psychiatric: She has a normal mood and affect  Her behavior is normal  Judgment normal    Dementia is progressive most of the history from caretaker and daughter   Nursing note and vitals reviewed

## 2018-09-26 NOTE — PATIENT INSTRUCTIONS
We will see her back in 4 months am not going to change the plan you may give her gabapentin if you need to give her 1 or 2 extra capsule you may do so so it up to 500 a day

## 2018-09-26 NOTE — ASSESSMENT & PLAN NOTE
The mentions progressive she is happy her gabapentin is helping her sleep I told her to the can use gabapentin during the day for behavior agitation she is taking gabapentin 2 at bedtime 100 she can take an extra 200 during the day if needed for total 400 per day

## 2018-11-20 DIAGNOSIS — G30.1 LATE ONSET ALZHEIMER'S DISEASE WITH BEHAVIORAL DISTURBANCE (HCC): ICD-10-CM

## 2018-11-20 DIAGNOSIS — R45.1 AGITATION: ICD-10-CM

## 2018-11-20 DIAGNOSIS — F02.81 LATE ONSET ALZHEIMER'S DISEASE WITH BEHAVIORAL DISTURBANCE (HCC): ICD-10-CM

## 2018-11-20 RX ORDER — GABAPENTIN 100 MG/1
CAPSULE ORAL
Qty: 100 CAPSULE | Refills: 2 | Status: SHIPPED | OUTPATIENT
Start: 2018-11-20 | End: 2019-03-25 | Stop reason: SDUPTHER

## 2019-01-30 ENCOUNTER — OFFICE VISIT (OUTPATIENT)
Dept: INTERNAL MEDICINE CLINIC | Facility: CLINIC | Age: 84
End: 2019-01-30
Payer: MEDICARE

## 2019-01-30 VITALS
TEMPERATURE: 97.4 F | BODY MASS INDEX: 24.89 KG/M2 | SYSTOLIC BLOOD PRESSURE: 136 MMHG | RESPIRATION RATE: 14 BRPM | DIASTOLIC BLOOD PRESSURE: 80 MMHG | HEART RATE: 81 BPM | WEIGHT: 115 LBS

## 2019-01-30 DIAGNOSIS — C44.629 SQUAMOUS CELL CANCER OF SKIN OF LEFT FOREARM: ICD-10-CM

## 2019-01-30 DIAGNOSIS — F02.81 LATE ONSET ALZHEIMER'S DISEASE WITH BEHAVIORAL DISTURBANCE (HCC): Primary | ICD-10-CM

## 2019-01-30 DIAGNOSIS — G30.1 LATE ONSET ALZHEIMER'S DISEASE WITH BEHAVIORAL DISTURBANCE (HCC): Primary | ICD-10-CM

## 2019-01-30 DIAGNOSIS — K50.10 CROHN'S DISEASE OF COLON WITHOUT COMPLICATION (HCC): ICD-10-CM

## 2019-01-30 DIAGNOSIS — C44.629 SQUAMOUS CELL CARCINOMA OF SKIN OF LEFT UPPER EXTREMITY, INCLUDING SHOULDER: ICD-10-CM

## 2019-01-30 DIAGNOSIS — M19.91 PRIMARY OSTEOARTHRITIS, UNSPECIFIED SITE: ICD-10-CM

## 2019-01-30 PROCEDURE — 99214 OFFICE O/P EST MOD 30 MIN: CPT | Performed by: INTERNAL MEDICINE

## 2019-01-30 NOTE — ASSESSMENT & PLAN NOTE
She is here with her 8 and her daughter doing well gabapentin seems to be helping they gave her from 2-4 a day

## 2019-01-30 NOTE — ASSESSMENT & PLAN NOTE
She is in remission she is not taking  dezicol she has had no changes in bowel movement no rectal bleeding no abdominal pain no fever or chills

## 2019-01-30 NOTE — ASSESSMENT & PLAN NOTE
She has developed another squamous cell cancer in the upper arm on the left refer to plastic for evaluation removal she has an abrasion in the tip of the nose as suspected maybe an early squamous cell cancer or actinic keratoses that she picks [facility] to review that

## 2019-01-30 NOTE — PATIENT INSTRUCTIONS
Get her lab work to done at least before the next office visit  As see the plastic surgeon concerning lesion in your left arm and also to have him check the nose  I will see her back in the spring

## 2019-01-30 NOTE — PROGRESS NOTES
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, including shoulder  She has developed another squamous cell cancer in the upper arm on the left refer to plastic for evaluation removal she has an abrasion in the tip of the nose as suspected maybe an early squamous cell cancer or actinic keratoses that she picks [facility] to review that       Diagnoses and all orders for this visit:    Late onset Alzheimer's disease with behavioral disturbance  -     CBC and differential; Future  -     Comprehensive metabolic panel; Future  -     Lipid Panel with Direct LDL reflex; Future  -     TSH, 3rd generation with Free T4 reflex; Future  -     Vitamin D 25 hydroxy; Future  -     Magnesium; Future  -     Gamma GT; Future    Crohn's disease of colon without complication ([facility] )  -     CBC and differential; Future  -     Comprehensive metabolic panel; Future  -     Lipid Panel with Direct LDL reflex; Future  -     TSH, 3rd generation with Free T4 reflex; Future  -     Vitamin D 25 hydroxy; Future  -     Magnesium; Future  -     Gamma GT; Future    Primary osteoarthritis, unspecified site  -     CBC and differential; Future  -     Comprehensive metabolic panel; Future  -     Lipid Panel with Direct LDL reflex; Future  -     TSH, 3rd generation with Free T4 reflex; Future  -     Vitamin D 25 hydroxy; Future  -     Magnesium; Future  -     Gamma GT; Future    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  -     Ambulatory referral to Plastic Surgery;  Future    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, including shoulder          Subjective:      Patient ID: Breanna Coulter is a 80 y o  female  Chief Complaint   Patient presents with    Follow-up         Current Outpatient Prescriptions:     acetaminophen (TYLENOL) 325 mg tablet, Take 650 mg by mouth every 6 (six) hours as needed for mild pain , Disp: , Rfl:     calcium carbonate (OS-KYLEE) 600 MG tablet, Take 600 mg by mouth daily  , Disp: , Rfl:     gabapentin (NEURONTIN) 100 mg capsule, Take 1 capsule in the morning and 1 or 2 capsules at bedtime for up to 3 capsules per day, Disp: 100 capsule, Rfl: 2    ketoconazole (NIZORAL) 2 % cream, Apply 1 application topically 2 (two) times a day, Disp: 15 g, Rfl: 0    multivitamin (THERAGRAN) TABS, Take 1 tablet by mouth daily  , Disp: , Rfl:     Vitamin D, Cholecalciferol, 400 UNITS CAPS, Take 1,000 Units by mouth daily  , Disp: , Rfl:     HPI    The following portions of the patient's history were reviewed and updated as appropriate: allergies, current medications, past family history, past medical history, past social history, past surgical history and problem list     Review of Systems   Constitutional: Negative  Negative for activity change, appetite change, fatigue, fever and unexpected weight change  HENT: Negative for congestion, ear pain, hearing loss, mouth sores, postnasal drip, rhinorrhea, sore throat, trouble swallowing and voice change  Eyes: Negative for pain, redness and visual disturbance  Respiratory: Negative for cough, chest tightness, shortness of breath and wheezing  Cardiovascular: Negative for chest pain, palpitations and leg swelling  Gastrointestinal: Negative for abdominal distention, abdominal pain, blood in stool, constipation, diarrhea and nausea  Endocrine: Negative for cold intolerance, heat intolerance, polydipsia, polyphagia and polyuria     Genitourinary: Negative for difficulty urinating, dysuria, flank pain, frequency, hematuria and urgency  Musculoskeletal: Negative for arthralgias, back pain, gait problem, joint swelling and myalgias  Skin: Negative for color change and pallor  Neurological: Negative for dizziness, tremors, seizures, syncope, weakness, numbness and headaches  Hematological: Negative for adenopathy  Does not bruise/bleed easily  Psychiatric/Behavioral: Negative  Negative for sleep disturbance  The patient is not nervous/anxious            Objective:     Results for orders placed or performed in visit on 09/24/18   CBC and differential   Result Value Ref Range    WBC 6 79 4 31 - 10 16 Thousand/uL    RBC 4 72 3 81 - 5 12 Million/uL    Hemoglobin 14 1 11 5 - 15 4 g/dL    Hematocrit 44 6 34 8 - 46 1 %    MCV 95 82 - 98 fL    MCH 29 9 26 8 - 34 3 pg    MCHC 31 6 31 4 - 37 4 g/dL    RDW 13 8 11 6 - 15 1 %    MPV 10 5 8 9 - 12 7 fL    Platelets 938 279 - 197 Thousands/uL    nRBC 0 /100 WBCs    Neutrophils Relative 68 43 - 75 %    Immat GRANS % 0 0 - 2 %    Lymphocytes Relative 22 14 - 44 %    Monocytes Relative 8 4 - 12 %    Eosinophils Relative 2 0 - 6 %    Basophils Relative 0 0 - 1 %    Neutrophils Absolute 4 61 1 85 - 7 62 Thousands/µL    Immature Grans Absolute 0 03 0 00 - 0 20 Thousand/uL    Lymphocytes Absolute 1 51 0 60 - 4 47 Thousands/µL    Monocytes Absolute 0 51 0 17 - 1 22 Thousand/µL    Eosinophils Absolute 0 10 0 00 - 0 61 Thousand/µL    Basophils Absolute 0 03 0 00 - 0 10 Thousands/µL   Comprehensive metabolic panel   Result Value Ref Range    Sodium 142 136 - 145 mmol/L    Potassium 3 7 3 5 - 5 3 mmol/L    Chloride 106 100 - 108 mmol/L    CO2 29 21 - 32 mmol/L    ANION GAP 7 4 - 13 mmol/L    BUN 14 5 - 25 mg/dL    Creatinine 0 98 0 60 - 1 30 mg/dL    Glucose 122 65 - 140 mg/dL    Calcium 9 6 8 3 - 10 1 mg/dL    AST 16 5 - 45 U/L    ALT 14 12 - 78 U/L    Alkaline Phosphatase 80 46 - 116 U/L    Total Protein 7 5 6 4 - 8 2 g/dL    Albumin 3 4 (L) 3 5 - 5 0 g/dL    Total Bilirubin 0 60 0 20 - 1 00 mg/dL    eGFR 50 ml/min/1 73sq m   Gamma GT   Result Value Ref Range    GGT 8 5 - 85 U/L       /80 (BP Location: Left arm, Patient Position: Sitting, Cuff Size: Standard)   Pulse 81   Temp (!) 97 4 °F (36 3 °C)   Resp 14   Wt 52 2 kg (115 lb)   BMI 24 89 kg/m²      Physical Exam   Constitutional: She is oriented to person, place, and time  She appears well-developed and well-nourished  HENT:   Head: Normocephalic  Right Ear: External ear normal    Left Ear: External ear normal    Nose: Nose normal    Mouth/Throat: Oropharynx is clear and moist  No oropharyngeal exudate  Eyes: Pupils are equal, round, and reactive to light  Conjunctivae and EOM are normal    Neck: Normal range of motion  Neck supple  No thyromegaly present  Cardiovascular: Normal rate, regular rhythm, normal heart sounds and intact distal pulses  Exam reveals no gallop and no friction rub  No murmur heard  S1-S2 no gallops regular rhythm  Extremities no edema   Pulmonary/Chest: Effort normal and breath sounds normal  No respiratory distress  She has no wheezes  She has no rales  Lungs are clear no wheezing rales or rhonchi   Abdominal: Soft  Bowel sounds are normal  She exhibits no distension and no mass  There is no tenderness  There is no rebound and no guarding  Abdomen soft nontender   Musculoskeletal: Normal range of motion  Ambulates with a walker   Lymphadenopathy:     She has no cervical adenopathy  Neurological: She is alert and oriented to person, place, and time  Skin: Skin is warm and dry  Abrasion on the tip of the nose and squamous cell cancer like lesion on the left forearm with the corn groin suspicious for squamous cell cancer  About a cm in diameter   Psychiatric: She has a normal mood and affect  Her behavior is normal  Judgment normal    Nursing note and vitals reviewed

## 2019-03-25 DIAGNOSIS — R45.1 AGITATION: ICD-10-CM

## 2019-03-25 DIAGNOSIS — G30.1 LATE ONSET ALZHEIMER'S DISEASE WITH BEHAVIORAL DISTURBANCE (HCC): ICD-10-CM

## 2019-03-25 DIAGNOSIS — F02.81 LATE ONSET ALZHEIMER'S DISEASE WITH BEHAVIORAL DISTURBANCE (HCC): ICD-10-CM

## 2019-03-25 RX ORDER — GABAPENTIN 100 MG/1
CAPSULE ORAL
Qty: 100 CAPSULE | Refills: 1 | Status: SHIPPED | OUTPATIENT
Start: 2019-03-25 | End: 2019-10-29 | Stop reason: SDUPTHER

## 2019-04-05 DIAGNOSIS — B35.9 DERMATOPHYTOSES: ICD-10-CM

## 2019-04-05 RX ORDER — KETOCONAZOLE 20 MG/G
1 CREAM TOPICAL 2 TIMES DAILY
Qty: 15 G | Refills: 0 | Status: SHIPPED | OUTPATIENT
Start: 2019-04-05

## 2019-04-29 ENCOUNTER — HOSPITAL ENCOUNTER (EMERGENCY)
Facility: HOSPITAL | Age: 84
Discharge: HOME | End: 2019-04-29
Attending: EMERGENCY MEDICINE | Admitting: EMERGENCY MEDICINE
Payer: MEDICARE

## 2019-04-29 VITALS
HEART RATE: 69 BPM | SYSTOLIC BLOOD PRESSURE: 145 MMHG | DIASTOLIC BLOOD PRESSURE: 73 MMHG | OXYGEN SATURATION: 96 % | TEMPERATURE: 97.4 F | RESPIRATION RATE: 20 BRPM
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
## 2019-04-29 DIAGNOSIS — S60.229A HAND CONTUSION: ICD-10-CM

## 2019-04-29 PROCEDURE — 99283 EMERGENCY DEPT VISIT LOW MDM: CPT

## 2019-04-29 PROCEDURE — 99282 EMERGENCY DEPT VISIT SF MDM: CPT | Performed by: EMERGENCY MEDICINE

## 2019-05-18 ENCOUNTER — APPOINTMENT (EMERGENCY)
Dept: CT IMAGING | Facility: HOSPITAL | Age: 84
End: 2019-05-18
Payer: MEDICARE

## 2019-05-18 ENCOUNTER — HOSPITAL ENCOUNTER (EMERGENCY)
Facility: HOSPITAL | Age: 84
Discharge: HOME | End: 2019-05-18
Attending: EMERGENCY MEDICINE | Admitting: EMERGENCY MEDICINE
Payer: MEDICARE

## 2019-05-18 VITALS
DIASTOLIC BLOOD PRESSURE: 70 MMHG | SYSTOLIC BLOOD PRESSURE: 137 MMHG | OXYGEN SATURATION: 99 % | TEMPERATURE: 98.1 F | HEART RATE: 78 BPM | RESPIRATION RATE: 16 BRPM | BODY MASS INDEX: 25.76 KG/M2 | WEIGHT: 119.05 LBS

## 2019-05-18 DIAGNOSIS — S09.90XA CLOSED HEAD INJURY, INITIAL ENCOUNTER: ICD-10-CM
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
## 2019-05-18 DIAGNOSIS — S00.83XA CONTUSION OF FOREHEAD, INITIAL ENCOUNTER: ICD-10-CM

## 2019-05-18 PROCEDURE — 70450 CT HEAD/BRAIN W/O DYE: CPT

## 2019-05-18 PROCEDURE — 99283 EMERGENCY DEPT VISIT LOW MDM: CPT | Performed by: EMERGENCY MEDICINE

## 2019-05-18 PROCEDURE — 99283 EMERGENCY DEPT VISIT LOW MDM: CPT

## 2019-05-18 PROCEDURE — 72125 CT NECK SPINE W/O DYE: CPT

## 2019-05-29 ENCOUNTER — APPOINTMENT (EMERGENCY)
Dept: CT IMAGING | Facility: HOSPITAL | Age: 84
End: 2019-05-29
Payer: MEDICARE

## 2019-05-29 ENCOUNTER — HOSPITAL ENCOUNTER (EMERGENCY)
Facility: HOSPITAL | Age: 84
Discharge: HOME | End: 2019-05-29
Attending: EMERGENCY MEDICINE
Payer: MEDICARE

## 2019-05-29 VITALS
OXYGEN SATURATION: 95 % | WEIGHT: 121.47 LBS | SYSTOLIC BLOOD PRESSURE: 148 MMHG | RESPIRATION RATE: 18 BRPM | DIASTOLIC BLOOD PRESSURE: 70 MMHG | BODY MASS INDEX: 26.29 KG/M2 | TEMPERATURE: 98 F | HEART RATE: 70 BPM

## 2019-05-29 DIAGNOSIS — T14.8XXA NONTRAUMATIC TEAR OF SKIN: ICD-10-CM
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
## 2019-05-29 PROCEDURE — 12013 RPR F/E/E/N/L/M 2.6-5.0 CM: CPT | Performed by: NURSE PRACTITIONER

## 2019-05-29 PROCEDURE — 99284 EMERGENCY DEPT VISIT MOD MDM: CPT

## 2019-05-29 PROCEDURE — 72125 CT NECK SPINE W/O DYE: CPT

## 2019-05-29 PROCEDURE — 99283 EMERGENCY DEPT VISIT LOW MDM: CPT | Performed by: NURSE PRACTITIONER

## 2019-06-05 ENCOUNTER — OFFICE VISIT (OUTPATIENT)
Dept: INTERNAL MEDICINE CLINIC | Facility: CLINIC | Age: 84
End: 2019-06-05
Payer: MEDICARE

## 2019-06-05 VITALS
BODY MASS INDEX: 24.84 KG/M2 | WEIGHT: 114.8 LBS | TEMPERATURE: 97.8 F | SYSTOLIC BLOOD PRESSURE: 120 MMHG | HEART RATE: 117 BPM | OXYGEN SATURATION: 94 % | DIASTOLIC BLOOD PRESSURE: 70 MMHG

## 2019-06-05 DIAGNOSIS — F02.81 LATE ONSET ALZHEIMER'S DISEASE WITH BEHAVIORAL DISTURBANCE (HCC): Primary | ICD-10-CM

## 2019-06-05 DIAGNOSIS — G30.1 LATE ONSET ALZHEIMER'S DISEASE WITH BEHAVIORAL DISTURBANCE (HCC): Primary | ICD-10-CM

## 2019-06-05 DIAGNOSIS — K50.10 CROHN'S DISEASE OF COLON WITHOUT COMPLICATION (HCC): ICD-10-CM

## 2019-06-05 DIAGNOSIS — S00.91XA ABRASION OF HEAD, INITIAL ENCOUNTER: ICD-10-CM

## 2019-06-05 DIAGNOSIS — C44.629 SQUAMOUS CELL CARCINOMA OF SKIN OF LEFT UPPER EXTREMITY, INCLUDING SHOULDER: ICD-10-CM

## 2019-06-05 DIAGNOSIS — M19.91 PRIMARY OSTEOARTHRITIS, UNSPECIFIED SITE: ICD-10-CM

## 2019-06-05 PROCEDURE — 99214 OFFICE O/P EST MOD 30 MIN: CPT | Performed by: INTERNAL MEDICINE

## 2019-06-05 PROCEDURE — 90714 TD VACC NO PRESV 7 YRS+ IM: CPT | Performed by: INTERNAL MEDICINE

## 2019-06-05 PROCEDURE — 90471 IMMUNIZATION ADMIN: CPT | Performed by: INTERNAL MEDICINE

## 2019-06-17 ENCOUNTER — APPOINTMENT (EMERGENCY)
Dept: RADIOLOGY | Facility: HOSPITAL | Age: 84
DRG: 069 | End: 2019-06-17
Payer: MEDICARE

## 2019-06-17 ENCOUNTER — HOSPITAL ENCOUNTER (INPATIENT)
Facility: HOSPITAL | Age: 84
LOS: 1 days | Discharge: HOME | DRG: 069 | End: 2019-06-18
Attending: EMERGENCY MEDICINE | Admitting: HOSPITALIST
Payer: MEDICARE

## 2019-06-17 ENCOUNTER — APPOINTMENT (EMERGENCY)
Dept: CT IMAGING | Facility: HOSPITAL | Age: 84
DRG: 069 | End: 2019-06-17
Payer: MEDICARE

## 2019-06-17 DIAGNOSIS — G30.9 ALZHEIMER'S DEMENTIA (HCC): ICD-10-CM

## 2019-06-17 DIAGNOSIS — F02.80 ALZHEIMER'S DEMENTIA (HCC): ICD-10-CM

## 2019-06-17 DIAGNOSIS — R91.8 MASS OF UPPER LOBE OF LEFT LUNG: ICD-10-CM

## 2019-06-17 DIAGNOSIS — I63.9 STROKE (HCC): Primary | ICD-10-CM

## 2019-06-17 PROBLEM — R29.90 STROKE-LIKE SYMPTOM: Status: ACTIVE | Noted: 2019-06-17

## 2019-06-17 LAB
ABO GROUP BLD: NORMAL
ANION GAP BLD CALC-SCNC: 17 MMOL/L (ref 4–13)
ANION GAP SERPL CALCULATED.3IONS-SCNC: 7 MMOL/L (ref 4–13)
APTT PPP: 26 SECONDS (ref 26–38)
ATRIAL RATE: 71 BPM
BLD GP AB SCN SERPL QL: NEGATIVE
BUN BLD-MCNC: 12 MG/DL (ref 5–25)
BUN SERPL-MCNC: 14 MG/DL (ref 5–25)
CA-I BLD-SCNC: 1.19 MMOL/L (ref 1.12–1.32)
CALCIUM SERPL-MCNC: 9.6 MG/DL (ref 8.3–10.1)
CHLORIDE BLD-SCNC: 104 MMOL/L (ref 100–108)
CHLORIDE SERPL-SCNC: 108 MMOL/L (ref 100–108)
CO2 SERPL-SCNC: 26 MMOL/L (ref 21–32)
CREAT BLD-MCNC: 0.9 MG/DL (ref 0.6–1.3)
CREAT SERPL-MCNC: 0.88 MG/DL (ref 0.6–1.3)
ERYTHROCYTE [DISTWIDTH] IN BLOOD BY AUTOMATED COUNT: 13.8 % (ref 11.6–15.1)
GFR SERPL CREATININE-BSD FRML MDRD: 55 ML/MIN/1.73SQ M
GFR SERPL CREATININE-BSD FRML MDRD: 57 ML/MIN/1.73SQ M
GLUCOSE SERPL-MCNC: 97 MG/DL (ref 65–140)
GLUCOSE SERPL-MCNC: 97 MG/DL (ref 65–140)
HCT VFR BLD AUTO: 37 % (ref 34.8–46.1)
HCT VFR BLD CALC: 34 % (ref 34.8–46.1)
HGB BLD-MCNC: 11.7 G/DL (ref 11.5–15.4)
HGB BLDA-MCNC: 11.6 G/DL (ref 11.5–15.4)
INR PPP: 1.04 (ref 0.86–1.17)
MCH RBC QN AUTO: 29.8 PG (ref 26.8–34.3)
MCHC RBC AUTO-ENTMCNC: 31.6 G/DL (ref 31.4–37.4)
MCV RBC AUTO: 94 FL (ref 82–98)
P AXIS: 87 DEGREES
PCO2 BLD: 25 MMOL/L (ref 21–32)
PLATELET # BLD AUTO: 231 THOUSANDS/UL (ref 149–390)
PMV BLD AUTO: 9.7 FL (ref 8.9–12.7)
POTASSIUM BLD-SCNC: 3.8 MMOL/L (ref 3.5–5.3)
POTASSIUM SERPL-SCNC: 4 MMOL/L (ref 3.5–5.3)
PR INTERVAL: 188 MS
PROTHROMBIN TIME: 13.7 SECONDS (ref 11.8–14.2)
QRS AXIS: -38 DEGREES
QRSD INTERVAL: 130 MS
QT INTERVAL: 394 MS
QTC INTERVAL: 428 MS
RBC # BLD AUTO: 3.92 MILLION/UL (ref 3.81–5.12)
RH BLD: POSITIVE
SODIUM BLD-SCNC: 141 MMOL/L (ref 136–145)
SODIUM SERPL-SCNC: 141 MMOL/L (ref 136–145)
SPECIMEN EXPIRATION DATE: NORMAL
SPECIMEN SOURCE: ABNORMAL
T WAVE AXIS: 28 DEGREES
VENTRICULAR RATE: 71 BPM
WBC # BLD AUTO: 7.53 THOUSAND/UL (ref 4.31–10.16)

## 2019-06-17 PROCEDURE — 93010 ELECTROCARDIOGRAM REPORT: CPT | Performed by: EMERGENCY MEDICINE

## 2019-06-17 PROCEDURE — 1123F ACP DISCUSS/DSCN MKR DOCD: CPT | Performed by: EMERGENCY MEDICINE

## 2019-06-17 PROCEDURE — 70450 CT HEAD/BRAIN W/O DYE: CPT

## 2019-06-17 PROCEDURE — 85610 PROTHROMBIN TIME: CPT | Performed by: EMERGENCY MEDICINE

## 2019-06-17 PROCEDURE — 99223 1ST HOSP IP/OBS HIGH 75: CPT | Performed by: HOSPITALIST

## 2019-06-17 PROCEDURE — 85730 THROMBOPLASTIN TIME PARTIAL: CPT | Performed by: EMERGENCY MEDICINE

## 2019-06-17 PROCEDURE — 70498 CT ANGIOGRAPHY NECK: CPT

## 2019-06-17 PROCEDURE — 85027 COMPLETE CBC AUTOMATED: CPT | Performed by: EMERGENCY MEDICINE

## 2019-06-17 PROCEDURE — 86850 RBC ANTIBODY SCREEN: CPT | Performed by: EMERGENCY MEDICINE

## 2019-06-17 PROCEDURE — 93010 ELECTROCARDIOGRAM REPORT: CPT | Performed by: INTERNAL MEDICINE

## 2019-06-17 PROCEDURE — 71045 X-RAY EXAM CHEST 1 VIEW: CPT

## 2019-06-17 PROCEDURE — 85014 HEMATOCRIT: CPT

## 2019-06-17 PROCEDURE — 80047 BASIC METABLC PNL IONIZED CA: CPT

## 2019-06-17 PROCEDURE — 99222 1ST HOSP IP/OBS MODERATE 55: CPT | Performed by: PSYCHIATRY & NEUROLOGY

## 2019-06-17 PROCEDURE — 86900 BLOOD TYPING SEROLOGIC ABO: CPT | Performed by: EMERGENCY MEDICINE

## 2019-06-17 PROCEDURE — 93005 ELECTROCARDIOGRAM TRACING: CPT

## 2019-06-17 PROCEDURE — 86901 BLOOD TYPING SEROLOGIC RH(D): CPT | Performed by: EMERGENCY MEDICINE

## 2019-06-17 PROCEDURE — 99283 EMERGENCY DEPT VISIT LOW MDM: CPT | Performed by: EMERGENCY MEDICINE

## 2019-06-17 PROCEDURE — 70496 CT ANGIOGRAPHY HEAD: CPT

## 2019-06-17 PROCEDURE — 80048 BASIC METABOLIC PNL TOTAL CA: CPT | Performed by: EMERGENCY MEDICINE

## 2019-06-17 PROCEDURE — 99291 CRITICAL CARE FIRST HOUR: CPT

## 2019-06-17 PROCEDURE — 36415 COLL VENOUS BLD VENIPUNCTURE: CPT | Performed by: EMERGENCY MEDICINE

## 2019-06-17 RX ORDER — ASPIRIN 81 MG/1
324 TABLET, CHEWABLE ORAL ONCE
Status: COMPLETED | OUTPATIENT
Start: 2019-06-17 | End: 2019-06-17

## 2019-06-17 RX ORDER — ASPIRIN 325 MG
325 TABLET ORAL DAILY
Status: DISCONTINUED | OUTPATIENT
Start: 2019-06-17 | End: 2019-06-18 | Stop reason: HOSPADM

## 2019-06-17 RX ORDER — ATORVASTATIN CALCIUM 40 MG/1
40 TABLET, FILM COATED ORAL EVERY EVENING
Status: DISCONTINUED | OUTPATIENT
Start: 2019-06-17 | End: 2019-06-18 | Stop reason: HOSPADM

## 2019-06-17 RX ADMIN — ENOXAPARIN SODIUM 40 MG: 40 INJECTION SUBCUTANEOUS at 15:00

## 2019-06-17 RX ADMIN — ASPIRIN 81 MG 324 MG: 81 TABLET ORAL at 10:30

## 2019-06-17 RX ADMIN — ATORVASTATIN CALCIUM 40 MG: 40 TABLET, FILM COATED ORAL at 17:39

## 2019-06-17 RX ADMIN — IODIXANOL 85 ML: 320 INJECTION, SOLUTION INTRAVASCULAR at 08:47

## 2019-06-18 ENCOUNTER — APPOINTMENT (INPATIENT)
Dept: NON INVASIVE DIAGNOSTICS | Facility: HOSPITAL | Age: 84
DRG: 069 | End: 2019-06-18
Payer: MEDICARE

## 2019-06-18 VITALS
SYSTOLIC BLOOD PRESSURE: 115 MMHG | HEART RATE: 88 BPM | OXYGEN SATURATION: 98 % | HEIGHT: 57 IN | WEIGHT: 111.77 LBS | TEMPERATURE: 96.7 F | BODY MASS INDEX: 24.11 KG/M2 | RESPIRATION RATE: 18 BRPM | DIASTOLIC BLOOD PRESSURE: 57 MMHG

## 2019-06-18 PROBLEM — R29.90 STROKE-LIKE SYMPTOM: Status: RESOLVED | Noted: 2019-06-17 | Resolved: 2019-06-18

## 2019-06-18 PROBLEM — F03.90 DEMENTIA (HCC): Status: ACTIVE | Noted: 2019-06-18

## 2019-06-18 PROBLEM — R91.8 MASS OF UPPER LOBE OF LEFT LUNG: Status: ACTIVE | Noted: 2019-06-18

## 2019-06-18 LAB
CHOLEST SERPL-MCNC: 168 MG/DL (ref 50–200)
EST. AVERAGE GLUCOSE BLD GHB EST-MCNC: 111 MG/DL
HBA1C MFR BLD: 5.5 % (ref 4.2–6.3)
HDLC SERPL-MCNC: 87 MG/DL (ref 40–60)
LDLC SERPL CALC-MCNC: 72 MG/DL (ref 0–100)
TRIGL SERPL-MCNC: 47 MG/DL

## 2019-06-18 PROCEDURE — G8979 MOBILITY GOAL STATUS: HCPCS

## 2019-06-18 PROCEDURE — G8996 SWALLOW CURRENT STATUS: HCPCS

## 2019-06-18 PROCEDURE — 93306 TTE W/DOPPLER COMPLETE: CPT | Performed by: INTERNAL MEDICINE

## 2019-06-18 PROCEDURE — G8987 SELF CARE CURRENT STATUS: HCPCS

## 2019-06-18 PROCEDURE — 92610 EVALUATE SWALLOWING FUNCTION: CPT

## 2019-06-18 PROCEDURE — 97163 PT EVAL HIGH COMPLEX 45 MIN: CPT

## 2019-06-18 PROCEDURE — 99239 HOSP IP/OBS DSCHRG MGMT >30: CPT | Performed by: HOSPITALIST

## 2019-06-18 PROCEDURE — G8988 SELF CARE GOAL STATUS: HCPCS

## 2019-06-18 PROCEDURE — 97166 OT EVAL MOD COMPLEX 45 MIN: CPT

## 2019-06-18 PROCEDURE — 93306 TTE W/DOPPLER COMPLETE: CPT

## 2019-06-18 PROCEDURE — G8998 SWALLOW D/C STATUS: HCPCS

## 2019-06-18 PROCEDURE — G8978 MOBILITY CURRENT STATUS: HCPCS

## 2019-06-18 PROCEDURE — 80061 LIPID PANEL: CPT | Performed by: HOSPITALIST

## 2019-06-18 PROCEDURE — 83036 HEMOGLOBIN GLYCOSYLATED A1C: CPT | Performed by: HOSPITALIST

## 2019-06-18 PROCEDURE — G8997 SWALLOW GOAL STATUS: HCPCS

## 2019-06-18 RX ORDER — ATORVASTATIN CALCIUM 40 MG/1
40 TABLET, FILM COATED ORAL EVERY EVENING
Qty: 30 TABLET | Refills: 0 | Status: SHIPPED | OUTPATIENT
Start: 2019-06-18

## 2019-06-18 RX ORDER — ATORVASTATIN CALCIUM 40 MG/1
40 TABLET, FILM COATED ORAL EVERY EVENING
Qty: 30 TABLET | Refills: 0
Start: 2019-06-18 | End: 2019-06-18

## 2019-06-18 RX ORDER — ATORVASTATIN CALCIUM 40 MG/1
40 TABLET, FILM COATED ORAL EVERY EVENING
Qty: 30 TABLET | Refills: 1
Start: 2019-06-18 | End: 2019-06-18

## 2019-06-18 RX ORDER — ASPIRIN 81 MG/1
81 TABLET ORAL DAILY
Qty: 30 TABLET | Refills: 0
Start: 2019-06-18 | End: 2019-06-18 | Stop reason: SDUPTHER

## 2019-06-18 RX ORDER — ASPIRIN 81 MG/1
81 TABLET ORAL DAILY
Qty: 30 TABLET | Refills: 0 | Status: SHIPPED | OUTPATIENT
Start: 2019-06-18 | End: 2019-10-29 | Stop reason: SDUPTHER

## 2019-06-18 RX ORDER — ASPIRIN 81 MG/1
81 TABLET ORAL DAILY
Qty: 30 TABLET | Refills: 3
Start: 2019-06-18 | End: 2019-06-18 | Stop reason: SDUPTHER

## 2019-06-18 RX ADMIN — ASPIRIN 325 MG ORAL TABLET 325 MG: 325 PILL ORAL at 09:51

## 2019-06-18 RX ADMIN — ATORVASTATIN CALCIUM 40 MG: 40 TABLET, FILM COATED ORAL at 17:08

## 2019-06-18 RX ADMIN — ENOXAPARIN SODIUM 40 MG: 40 INJECTION SUBCUTANEOUS at 09:51

## 2019-06-19 ENCOUNTER — TELEPHONE (OUTPATIENT)
Dept: NEUROLOGY | Facility: CLINIC | Age: 84
End: 2019-06-19

## 2019-06-19 ENCOUNTER — TRANSITIONAL CARE MANAGEMENT (OUTPATIENT)
Dept: INTERNAL MEDICINE CLINIC | Facility: CLINIC | Age: 84
End: 2019-06-19

## 2019-06-20 ENCOUNTER — HOSPITAL ENCOUNTER (EMERGENCY)
Facility: HOSPITAL | Age: 84
Discharge: HOME | End: 2019-06-20
Attending: EMERGENCY MEDICINE | Admitting: EMERGENCY MEDICINE
Payer: MEDICARE

## 2019-06-20 ENCOUNTER — TELEPHONE (OUTPATIENT)
Dept: INTERNAL MEDICINE CLINIC | Facility: CLINIC | Age: 84
End: 2019-06-20

## 2019-06-20 VITALS
DIASTOLIC BLOOD PRESSURE: 63 MMHG | HEART RATE: 91 BPM | BODY MASS INDEX: 24.95 KG/M2 | RESPIRATION RATE: 16 BRPM | TEMPERATURE: 97.6 F | WEIGHT: 115.3 LBS | OXYGEN SATURATION: 95 % | SYSTOLIC BLOOD PRESSURE: 113 MMHG

## 2019-06-20 DIAGNOSIS — G45.9 TIA (TRANSIENT ISCHEMIC ATTACK): Primary | ICD-10-CM

## 2019-06-20 PROCEDURE — 99283 EMERGENCY DEPT VISIT LOW MDM: CPT | Performed by: EMERGENCY MEDICINE

## 2019-06-20 PROCEDURE — 99284 EMERGENCY DEPT VISIT MOD MDM: CPT

## 2019-06-24 ENCOUNTER — TELEPHONE (OUTPATIENT)
Dept: NEUROLOGY | Facility: CLINIC | Age: 84
End: 2019-06-24

## 2019-10-29 DIAGNOSIS — F02.81 LATE ONSET ALZHEIMER'S DISEASE WITH BEHAVIORAL DISTURBANCE (HCC): ICD-10-CM

## 2019-10-29 DIAGNOSIS — I63.9 STROKE (HCC): ICD-10-CM

## 2019-10-29 DIAGNOSIS — R45.1 AGITATION: ICD-10-CM

## 2019-10-29 DIAGNOSIS — G30.1 LATE ONSET ALZHEIMER'S DISEASE WITH BEHAVIORAL DISTURBANCE (HCC): ICD-10-CM

## 2019-10-29 RX ORDER — ASPIRIN 81 MG/1
81 TABLET ORAL DAILY
Qty: 30 TABLET | Refills: 0 | Status: SHIPPED | OUTPATIENT
Start: 2019-10-29

## 2019-10-29 RX ORDER — GABAPENTIN 100 MG/1
CAPSULE ORAL
Qty: 100 CAPSULE | Refills: 1 | Status: SHIPPED | OUTPATIENT
Start: 2019-10-29 | End: 2019-12-10 | Stop reason: SDUPTHER

## 2019-11-12 DIAGNOSIS — M19.91 PRIMARY OSTEOARTHRITIS, UNSPECIFIED SITE: Primary | ICD-10-CM

## 2019-11-13 RX ORDER — ASPIRIN 81 MG/1
TABLET, COATED ORAL
Qty: 28 TABLET | Refills: 4 | Status: SHIPPED | OUTPATIENT
Start: 2019-11-13 | End: 2020-03-30

## 2019-12-10 DIAGNOSIS — R45.1 AGITATION: ICD-10-CM

## 2019-12-10 DIAGNOSIS — G30.1 LATE ONSET ALZHEIMER'S DISEASE WITH BEHAVIORAL DISTURBANCE (HCC): ICD-10-CM

## 2019-12-10 DIAGNOSIS — F02.81 LATE ONSET ALZHEIMER'S DISEASE WITH BEHAVIORAL DISTURBANCE (HCC): ICD-10-CM

## 2019-12-11 RX ORDER — GABAPENTIN 100 MG/1
CAPSULE ORAL
Qty: 84 CAPSULE | Refills: 4 | Status: SHIPPED | OUTPATIENT
Start: 2019-12-11

## 2020-03-30 DIAGNOSIS — M19.91 PRIMARY OSTEOARTHRITIS, UNSPECIFIED SITE: ICD-10-CM

## 2020-03-30 RX ORDER — ASPIRIN 81 MG/1
TABLET, COATED ORAL
Qty: 28 TABLET | Refills: 4 | Status: SHIPPED | OUTPATIENT
Start: 2020-03-30

## 2020-05-29 DIAGNOSIS — Z20.822 EXPOSURE TO COVID-19 VIRUS: Primary | ICD-10-CM

## 2020-06-02 LAB — EXT SARS-COV-2: NOT DETECTED

## 2020-06-10 ENCOUNTER — TELEPHONE (OUTPATIENT)
Dept: INTERNAL MEDICINE CLINIC | Facility: CLINIC | Age: 85
End: 2020-06-10

## 2020-08-10 DIAGNOSIS — M19.91 PRIMARY OSTEOARTHRITIS, UNSPECIFIED SITE: Primary | ICD-10-CM

## 2020-08-10 RX ORDER — ASPIRIN 81 MG/1
TABLET ORAL
Qty: 28 TABLET | Refills: 4 | Status: SHIPPED | OUTPATIENT
Start: 2020-08-10

## 2020-10-07 DIAGNOSIS — I63.9 STROKE (HCC): ICD-10-CM

## 2020-10-12 ENCOUNTER — HOSPITAL ENCOUNTER (EMERGENCY)
Facility: HOSPITAL | Age: 85
Discharge: HOME | End: 2020-10-12
Attending: EMERGENCY MEDICINE
Payer: MEDICARE

## 2020-10-12 VITALS — TEMPERATURE: 98.6 F

## 2020-10-12 DIAGNOSIS — S09.90XA INJURY OF HEAD, INITIAL ENCOUNTER: Primary | ICD-10-CM

## 2020-10-12 DIAGNOSIS — S01.80XA AVULSION OF SKIN OF FACE, INITIAL ENCOUNTER: ICD-10-CM

## 2020-10-12 PROCEDURE — 99282 EMERGENCY DEPT VISIT SF MDM: CPT | Performed by: EMERGENCY MEDICINE

## 2020-10-12 PROCEDURE — 99284 EMERGENCY DEPT VISIT MOD MDM: CPT

## 2022-03-04 ENCOUNTER — TELEPHONE (OUTPATIENT)
Dept: INTERNAL MEDICINE CLINIC | Facility: CLINIC | Age: 87
End: 2022-03-04

## 2022-03-04 NOTE — TELEPHONE ENCOUNTER
Please remove Dr Julio Roy from PCP    As per note from 10/12 patient at [facility] [address]

## 2022-06-07 NOTE — TELEPHONE ENCOUNTER
06/07/22 10:28 AM     Thank you for your request  Your request has been received, reviewed, and the patient chart updated  The PCP has successfully been removed with a patient attribution note  This message will now be completed      Thank you  Tl Phillips